# Patient Record
Sex: FEMALE | Race: WHITE | NOT HISPANIC OR LATINO | URBAN - METROPOLITAN AREA
[De-identification: names, ages, dates, MRNs, and addresses within clinical notes are randomized per-mention and may not be internally consistent; named-entity substitution may affect disease eponyms.]

---

## 2015-03-17 RX ORDER — WARFARIN SODIUM 2.5 MG/1
1 TABLET ORAL
Qty: 0 | Refills: 0 | DISCHARGE
Start: 2015-03-17

## 2018-06-06 ENCOUNTER — EMERGENCY (EMERGENCY)
Facility: HOSPITAL | Age: 73
LOS: 1 days | Discharge: HOME | End: 2018-06-06
Attending: EMERGENCY MEDICINE
Payer: MEDICARE

## 2018-06-06 VITALS
DIASTOLIC BLOOD PRESSURE: 78 MMHG | OXYGEN SATURATION: 98 % | TEMPERATURE: 98 F | HEART RATE: 72 BPM | RESPIRATION RATE: 18 BRPM | SYSTOLIC BLOOD PRESSURE: 148 MMHG

## 2018-06-06 VITALS
DIASTOLIC BLOOD PRESSURE: 90 MMHG | RESPIRATION RATE: 18 BRPM | SYSTOLIC BLOOD PRESSURE: 154 MMHG | HEART RATE: 84 BPM | TEMPERATURE: 98 F | OXYGEN SATURATION: 99 %

## 2018-06-06 PROCEDURE — 72131 CT LUMBAR SPINE W/O DYE: CPT

## 2018-06-06 PROCEDURE — 72131 CT LUMBAR SPINE W/O DYE: CPT | Mod: 26

## 2018-06-06 PROCEDURE — 99284 EMERGENCY DEPT VISIT MOD MDM: CPT | Mod: 25

## 2018-06-06 PROCEDURE — 96372 THER/PROPH/DIAG INJ SC/IM: CPT

## 2018-06-06 PROCEDURE — 99285 EMERGENCY DEPT VISIT HI MDM: CPT

## 2018-06-06 RX ORDER — DEXAMETHASONE 0.5 MG/5ML
8 ELIXIR ORAL ONCE
Qty: 0 | Refills: 0 | Status: COMPLETED | OUTPATIENT
Start: 2018-06-06 | End: 2018-06-06

## 2018-06-06 RX ORDER — DEXAMETHASONE 0.5 MG/5ML
2 ELIXIR ORAL
Qty: 8 | Refills: 0
Start: 2018-06-06 | End: 2018-06-09

## 2018-06-06 RX ORDER — OXYCODONE AND ACETAMINOPHEN 5; 325 MG/1; MG/1
1 TABLET ORAL ONCE
Qty: 0 | Refills: 0 | Status: DISCONTINUED | OUTPATIENT
Start: 2018-06-06 | End: 2018-06-06

## 2018-06-06 RX ORDER — KETOROLAC TROMETHAMINE 30 MG/ML
60 SYRINGE (ML) INJECTION ONCE
Qty: 0 | Refills: 0 | Status: DISCONTINUED | OUTPATIENT
Start: 2018-06-06 | End: 2018-06-06

## 2018-06-06 RX ADMIN — Medication 60 MILLIGRAM(S): at 17:05

## 2018-06-06 RX ADMIN — OXYCODONE AND ACETAMINOPHEN 1 TABLET(S): 5; 325 TABLET ORAL at 17:06

## 2018-06-06 RX ADMIN — Medication 8 MILLIGRAM(S): at 16:59

## 2018-06-06 NOTE — ED PROVIDER NOTE - OBJECTIVE STATEMENT
Pertinent PMH/PSH/FHx/SHx and Review of Systems contained within:  patient offered telephonic interpretation but prefers her daughtger  73F hx of cva with left sided weakness, afib on coumadin pw right sided leg pain radiating from the back. no trauma, no fall. symptoms started approx in may and have been ongoing since then. she was given a presumptive dx of sciatica and has been taking tylenol for pain. however, the condition has been worsening with pain so bad she could hardly get out of bed today. Patient denies numbness, tingling or weakness of the lower extremity and denies retention or incontinence of the bowel or bladder. no other cp, sob, ha, vision change, nausea, vomiting, fever, rash, bleeding  Fh and Sh not otherwise contributory  ROS otherwise negative

## 2018-06-06 NOTE — ED PROVIDER NOTE - PHYSICAL EXAMINATION
Gen: Alert, NAD  Head: NC, AT   Eyes: PERRL, EOMI, normal lids/conjunctiva  ENT: normal hearing, patent oropharynx without erythema/exudate, uvula midline  Neck: supple, no tenderness, Trachea midline  Pulm: Bilateral BS, normal resp effort, no wheeze/stridor/retractions  CV: RRR, no M/R/G, 2+ radial and dp pulses bl, no edema  Abd: soft, NT/ND, +BS, no hepatosplenomegaly  Mskel: pain with passive elevation of the right lower ext. wearing knee brace   no ctl spine ttp.   Skin: bright red petechial rash of lower ext (daughter says this is a chronic condition). wearing patch on the lower back.  Neuro: AAOx3, decreased motor strength of lower ext on the left 4/5. decreased motor on the right 4/5 (appears 2/2 pain)

## 2018-06-06 NOTE — ED PROVIDER NOTE - MEDICAL DECISION MAKING DETAILS
patient pw right sided sciatica. patient pw right sided sciatica. lumbar ct shows mulitple level disc disease. will give pain meds and steroids and dc with surgical follow up.

## 2018-06-06 NOTE — ED ADULT NURSE NOTE - OBJECTIVE STATEMENT
Patient BIBA for inability to move . As per patients daughter at bedside patient has possible sciatica nerve problems and was seen by her MD and was given muscle relaxant and was taken it but was schedule for twice a day but she only gave mom at night due to fact that it made her mom drowsy, Today her mom called her and couldn't move screaming in pain to right leg. patient daughter called EMS

## 2018-06-06 NOTE — ED ADULT NURSE NOTE - CHPI ED SYMPTOMS NEG
no numbness/no dizziness/no chills/no decreased eating/drinking/no nausea/no vomiting/no tingling/no fever

## 2020-04-04 ENCOUNTER — INPATIENT (INPATIENT)
Facility: HOSPITAL | Age: 75
LOS: 0 days | Discharge: OTHER INSTITUTION | DRG: 177 | End: 2020-04-05
Attending: INTERNAL MEDICINE | Admitting: INTERNAL MEDICINE
Payer: MEDICARE

## 2020-04-04 VITALS
OXYGEN SATURATION: 96 % | SYSTOLIC BLOOD PRESSURE: 161 MMHG | DIASTOLIC BLOOD PRESSURE: 99 MMHG | HEART RATE: 97 BPM | TEMPERATURE: 100 F | RESPIRATION RATE: 19 BRPM

## 2020-04-04 LAB
ACETONE SERPL-MCNC: NEGATIVE — SIGNIFICANT CHANGE UP
ALBUMIN SERPL ELPH-MCNC: 3.8 G/DL — SIGNIFICANT CHANGE UP (ref 3.5–5)
ALP SERPL-CCNC: 173 U/L — HIGH (ref 40–120)
ALT FLD-CCNC: 58 U/L DA — SIGNIFICANT CHANGE UP (ref 10–60)
ANION GAP SERPL CALC-SCNC: 9 MMOL/L — SIGNIFICANT CHANGE UP (ref 5–17)
AST SERPL-CCNC: 34 U/L — SIGNIFICANT CHANGE UP (ref 10–40)
BASOPHILS # BLD AUTO: 0.03 K/UL — SIGNIFICANT CHANGE UP (ref 0–0.2)
BASOPHILS NFR BLD AUTO: 0.5 % — SIGNIFICANT CHANGE UP (ref 0–2)
BILIRUB SERPL-MCNC: 0.8 MG/DL — SIGNIFICANT CHANGE UP (ref 0.2–1.2)
BUN SERPL-MCNC: 14 MG/DL — SIGNIFICANT CHANGE UP (ref 7–18)
CALCIUM SERPL-MCNC: 8.7 MG/DL — SIGNIFICANT CHANGE UP (ref 8.4–10.5)
CHLORIDE SERPL-SCNC: 102 MMOL/L — SIGNIFICANT CHANGE UP (ref 96–108)
CK SERPL-CCNC: 115 U/L — SIGNIFICANT CHANGE UP (ref 21–215)
CO2 SERPL-SCNC: 24 MMOL/L — SIGNIFICANT CHANGE UP (ref 22–31)
CREAT SERPL-MCNC: 0.92 MG/DL — SIGNIFICANT CHANGE UP (ref 0.5–1.3)
EOSINOPHIL # BLD AUTO: 0.1 K/UL — SIGNIFICANT CHANGE UP (ref 0–0.5)
EOSINOPHIL NFR BLD AUTO: 1.7 % — SIGNIFICANT CHANGE UP (ref 0–6)
GLUCOSE SERPL-MCNC: 98 MG/DL — SIGNIFICANT CHANGE UP (ref 70–99)
HCT VFR BLD CALC: 56 % — HIGH (ref 34.5–45)
HGB BLD-MCNC: 18.7 G/DL — HIGH (ref 11.5–15.5)
IMM GRANULOCYTES NFR BLD AUTO: 0.2 % — SIGNIFICANT CHANGE UP (ref 0–1.5)
LACTATE SERPL-SCNC: 1.7 MMOL/L — SIGNIFICANT CHANGE UP (ref 0.7–2)
LYMPHOCYTES # BLD AUTO: 1.85 K/UL — SIGNIFICANT CHANGE UP (ref 1–3.3)
LYMPHOCYTES # BLD AUTO: 31.8 % — SIGNIFICANT CHANGE UP (ref 13–44)
MAGNESIUM SERPL-MCNC: 2 MG/DL — SIGNIFICANT CHANGE UP (ref 1.6–2.6)
MCHC RBC-ENTMCNC: 29.5 PG — SIGNIFICANT CHANGE UP (ref 27–34)
MCHC RBC-ENTMCNC: 33.4 GM/DL — SIGNIFICANT CHANGE UP (ref 32–36)
MCV RBC AUTO: 88.3 FL — SIGNIFICANT CHANGE UP (ref 80–100)
MONOCYTES # BLD AUTO: 0.48 K/UL — SIGNIFICANT CHANGE UP (ref 0–0.9)
MONOCYTES NFR BLD AUTO: 8.3 % — SIGNIFICANT CHANGE UP (ref 2–14)
NEUTROPHILS # BLD AUTO: 3.34 K/UL — SIGNIFICANT CHANGE UP (ref 1.8–7.4)
NEUTROPHILS NFR BLD AUTO: 57.5 % — SIGNIFICANT CHANGE UP (ref 43–77)
NRBC # BLD: 0 /100 WBCS — SIGNIFICANT CHANGE UP (ref 0–0)
NT-PROBNP SERPL-SCNC: 726 PG/ML — HIGH (ref 0–450)
PLATELET # BLD AUTO: 142 K/UL — LOW (ref 150–400)
POTASSIUM SERPL-MCNC: 3.9 MMOL/L — SIGNIFICANT CHANGE UP (ref 3.5–5.3)
POTASSIUM SERPL-SCNC: 3.9 MMOL/L — SIGNIFICANT CHANGE UP (ref 3.5–5.3)
PROT SERPL-MCNC: 8.1 G/DL — SIGNIFICANT CHANGE UP (ref 6–8.3)
RBC # BLD: 6.34 M/UL — HIGH (ref 3.8–5.2)
RBC # FLD: 13.5 % — SIGNIFICANT CHANGE UP (ref 10.3–14.5)
SODIUM SERPL-SCNC: 135 MMOL/L — SIGNIFICANT CHANGE UP (ref 135–145)
TROPONIN I SERPL-MCNC: <0.015 NG/ML — SIGNIFICANT CHANGE UP (ref 0–0.04)
WBC # BLD: 5.81 K/UL — SIGNIFICANT CHANGE UP (ref 3.8–10.5)
WBC # FLD AUTO: 5.81 K/UL — SIGNIFICANT CHANGE UP (ref 3.8–10.5)

## 2020-04-04 PROCEDURE — 70450 CT HEAD/BRAIN W/O DYE: CPT | Mod: 26

## 2020-04-04 PROCEDURE — 71045 X-RAY EXAM CHEST 1 VIEW: CPT | Mod: 26

## 2020-04-04 RX ORDER — SODIUM CHLORIDE 9 MG/ML
1000 INJECTION INTRAMUSCULAR; INTRAVENOUS; SUBCUTANEOUS
Refills: 0 | Status: DISCONTINUED | OUTPATIENT
Start: 2020-04-04 | End: 2020-04-05

## 2020-04-04 RX ORDER — CEFTRIAXONE 500 MG/1
1000 INJECTION, POWDER, FOR SOLUTION INTRAMUSCULAR; INTRAVENOUS ONCE
Refills: 0 | Status: COMPLETED | OUTPATIENT
Start: 2020-04-04 | End: 2020-04-04

## 2020-04-04 RX ORDER — ACETAMINOPHEN 500 MG
650 TABLET ORAL ONCE
Refills: 0 | Status: COMPLETED | OUTPATIENT
Start: 2020-04-04 | End: 2020-04-04

## 2020-04-04 RX ORDER — AZITHROMYCIN 500 MG/1
500 TABLET, FILM COATED ORAL ONCE
Refills: 0 | Status: COMPLETED | OUTPATIENT
Start: 2020-04-04 | End: 2020-04-04

## 2020-04-04 RX ADMIN — Medication 650 MILLIGRAM(S): at 23:03

## 2020-04-04 RX ADMIN — SODIUM CHLORIDE 125 MILLILITER(S): 9 INJECTION INTRAMUSCULAR; INTRAVENOUS; SUBCUTANEOUS at 23:04

## 2020-04-04 NOTE — ED PROVIDER NOTE - ENMT, MLM
Airway patent, Nasal mucosa clear. Mouth with normal mucosa. Throat has no vesicles, no oropharyngeal exudates and uvula is midline. dark discoloration to tongue

## 2020-04-04 NOTE — ED PROVIDER NOTE - MUSCULOSKELETAL, MLM
Spine appears normal, range of motion is not limited, no muscle or joint tenderness, Lt arm contracture

## 2020-04-04 NOTE — ED PROVIDER NOTE - CLINICAL SUMMARY MEDICAL DECISION MAKING FREE TEXT BOX
pt with acute confusion, , concern for infectious process, or metabolic abn. also pt's on Coumadin will get CT head r/o bleed

## 2020-04-04 NOTE — ED PROVIDER NOTE - CARE PLAN
Principal Discharge DX:	PNA (pneumonia)  Secondary Diagnosis:	Dehydration  Secondary Diagnosis:	Suspected 2019 novel coronavirus infection  Secondary Diagnosis:	Acute encephalopathy

## 2020-04-04 NOTE — ED PROVIDER NOTE - OBJECTIVE STATEMENT
75 yo female with PMhx of HTN, hypercholesteremia, presents BIBA with c/o generalized weakness. A complete HPI is unable to be obtained as the patient is a poor historian. Patient states that her daughter called the ambulance for her. Patient denies fever. 73 yo female with PMhx of HTN, hypercholesteremia, presents BIBA with c/o generalized weakness. A complete HPI is unable to be obtained as the patient is a poor historian. Patient states that her daughter called the ambulance for her. Patient denies fever.  Noted pt with coughing in ED.  Spoke with pt's daughter, pt became confused since yest., c/o not feeling weak, no appetite, pt lives alone, pt's currently on Coumadin 4mg, 2 weeks ago-INR was>4, stopped for 3 days, resume Coumadin 3mg this Tues. 2/2 CVA/ paroxysmal A.fib, Pt is unable to walk,  Pt has 2 HHA.

## 2020-04-04 NOTE — ED PROVIDER NOTE - UNABLE TO OBTAIN
A complete HPI is unable to be obtained as the patient is a poor historian Severe Illness/Injury A complete ROS is unable to be obtained as the patient is a poor historian

## 2020-04-04 NOTE — ED PROVIDER NOTE - PMH
History of hypertension    Hypercholesterolemia CVA (cerebral vascular accident)    History of hypertension    Hypercholesterolemia

## 2020-04-05 ENCOUNTER — INPATIENT (INPATIENT)
Facility: HOSPITAL | Age: 75
LOS: 8 days | Discharge: NURSING, REHAB OR LONG-TERM CARE | DRG: 177 | End: 2020-04-14
Attending: INTERNAL MEDICINE | Admitting: INTERNAL MEDICINE
Payer: MEDICARE

## 2020-04-05 VITALS
HEART RATE: 92 BPM | OXYGEN SATURATION: 98 % | RESPIRATION RATE: 18 BRPM | TEMPERATURE: 98 F | SYSTOLIC BLOOD PRESSURE: 153 MMHG | DIASTOLIC BLOOD PRESSURE: 96 MMHG

## 2020-04-05 VITALS
WEIGHT: 179.9 LBS | OXYGEN SATURATION: 95 % | TEMPERATURE: 98 F | HEIGHT: 71 IN | SYSTOLIC BLOOD PRESSURE: 179 MMHG | RESPIRATION RATE: 20 BRPM | HEART RATE: 101 BPM | DIASTOLIC BLOOD PRESSURE: 113 MMHG

## 2020-04-05 DIAGNOSIS — G93.40 ENCEPHALOPATHY, UNSPECIFIED: ICD-10-CM

## 2020-04-05 DIAGNOSIS — Z86.79 PERSONAL HISTORY OF OTHER DISEASES OF THE CIRCULATORY SYSTEM: ICD-10-CM

## 2020-04-05 DIAGNOSIS — J18.9 PNEUMONIA, UNSPECIFIED ORGANISM: ICD-10-CM

## 2020-04-05 DIAGNOSIS — I48.91 UNSPECIFIED ATRIAL FIBRILLATION: ICD-10-CM

## 2020-04-05 DIAGNOSIS — Z29.9 ENCOUNTER FOR PROPHYLACTIC MEASURES, UNSPECIFIED: ICD-10-CM

## 2020-04-05 LAB
ALBUMIN SERPL ELPH-MCNC: 3.2 G/DL — LOW (ref 3.4–5)
ALP SERPL-CCNC: 144 U/L — HIGH (ref 40–120)
ALT FLD-CCNC: 55 U/L — HIGH (ref 12–42)
ANION GAP SERPL CALC-SCNC: 11 MMOL/L — SIGNIFICANT CHANGE UP (ref 9–16)
APTT BLD: 31.1 SEC — SIGNIFICANT CHANGE UP (ref 27.5–36.3)
AST SERPL-CCNC: 40 U/L — HIGH (ref 15–37)
BASOPHILS # BLD AUTO: 0.03 K/UL — SIGNIFICANT CHANGE UP (ref 0–0.2)
BASOPHILS NFR BLD AUTO: 0.6 % — SIGNIFICANT CHANGE UP (ref 0–2)
BILIRUB SERPL-MCNC: 0.7 MG/DL — SIGNIFICANT CHANGE UP (ref 0.2–1.2)
BUN SERPL-MCNC: 14 MG/DL — SIGNIFICANT CHANGE UP (ref 7–23)
CALCIUM SERPL-MCNC: 7.9 MG/DL — LOW (ref 8.5–10.5)
CHLORIDE SERPL-SCNC: 103 MMOL/L — SIGNIFICANT CHANGE UP (ref 96–108)
CK SERPL-CCNC: 83 U/L — SIGNIFICANT CHANGE UP (ref 26–192)
CO2 SERPL-SCNC: 25 MMOL/L — SIGNIFICANT CHANGE UP (ref 22–31)
CREAT SERPL-MCNC: 0.89 MG/DL — SIGNIFICANT CHANGE UP (ref 0.5–1.3)
CRP SERPL-MCNC: 3.7 MG/DL — HIGH (ref 0–0.9)
DIGOXIN SERPL-MCNC: 0.9 NG/ML — SIGNIFICANT CHANGE UP (ref 0.8–2)
EOSINOPHIL # BLD AUTO: 0 K/UL — SIGNIFICANT CHANGE UP (ref 0–0.5)
EOSINOPHIL NFR BLD AUTO: 0 % — SIGNIFICANT CHANGE UP (ref 0–6)
FLU A RESULT: SIGNIFICANT CHANGE UP
FLUAV AG NPH QL: SIGNIFICANT CHANGE UP
FLUAV AG NPH QL: SIGNIFICANT CHANGE UP
FLUBV AG NPH QL: SIGNIFICANT CHANGE UP
FLUBV AG NPH QL: SIGNIFICANT CHANGE UP
GLUCOSE SERPL-MCNC: 93 MG/DL — SIGNIFICANT CHANGE UP (ref 70–99)
HCT VFR BLD CALC: 51 % — HIGH (ref 34.5–45)
HGB BLD-MCNC: 17.5 G/DL — HIGH (ref 11.5–15.5)
IMM GRANULOCYTES NFR BLD AUTO: 0.2 % — SIGNIFICANT CHANGE UP (ref 0–1.5)
INR BLD: 1.31 RATIO — HIGH (ref 0.88–1.16)
INR BLD: 1.32 — HIGH (ref 0.88–1.16)
LACTATE SERPL-SCNC: 1.4 MMOL/L — SIGNIFICANT CHANGE UP (ref 0.4–2)
LYMPHOCYTES # BLD AUTO: 1.9 K/UL — SIGNIFICANT CHANGE UP (ref 1–3.3)
LYMPHOCYTES # BLD AUTO: 37.5 % — SIGNIFICANT CHANGE UP (ref 13–44)
MCHC RBC-ENTMCNC: 30 PG — SIGNIFICANT CHANGE UP (ref 27–34)
MCHC RBC-ENTMCNC: 34.3 GM/DL — SIGNIFICANT CHANGE UP (ref 32–36)
MCV RBC AUTO: 87.3 FL — SIGNIFICANT CHANGE UP (ref 80–100)
MONOCYTES # BLD AUTO: 0.49 K/UL — SIGNIFICANT CHANGE UP (ref 0–0.9)
MONOCYTES NFR BLD AUTO: 9.7 % — SIGNIFICANT CHANGE UP (ref 2–14)
NEUTROPHILS # BLD AUTO: 2.63 K/UL — SIGNIFICANT CHANGE UP (ref 1.8–7.4)
NEUTROPHILS NFR BLD AUTO: 52 % — SIGNIFICANT CHANGE UP (ref 43–77)
NRBC # BLD: 0 /100 WBCS — SIGNIFICANT CHANGE UP (ref 0–0)
NT-PROBNP SERPL-SCNC: 498 PG/ML — HIGH
PLATELET # BLD AUTO: 126 K/UL — LOW (ref 150–400)
POTASSIUM SERPL-MCNC: 3 MMOL/L — LOW (ref 3.5–5.3)
POTASSIUM SERPL-SCNC: 3 MMOL/L — LOW (ref 3.5–5.3)
PROT SERPL-MCNC: 6.9 G/DL — SIGNIFICANT CHANGE UP (ref 6.4–8.2)
PROTHROM AB SERPL-ACNC: 14.6 SEC — HIGH (ref 10–12.9)
PROTHROM AB SERPL-ACNC: 14.9 SEC — HIGH (ref 10–12.9)
RAPID RVP RESULT: SIGNIFICANT CHANGE UP
RBC # BLD: 5.84 M/UL — HIGH (ref 3.8–5.2)
RBC # FLD: 13.6 % — SIGNIFICANT CHANGE UP (ref 10.3–14.5)
RSV RESULT: SIGNIFICANT CHANGE UP
RSV RESULT: SIGNIFICANT CHANGE UP
RSV RNA RESP QL NAA+PROBE: SIGNIFICANT CHANGE UP
RSV RNA RESP QL NAA+PROBE: SIGNIFICANT CHANGE UP
SODIUM SERPL-SCNC: 139 MMOL/L — SIGNIFICANT CHANGE UP (ref 132–145)
TROPONIN I SERPL-MCNC: 0.02 NG/ML — SIGNIFICANT CHANGE UP (ref 0.02–0.06)
WBC # BLD: 5.06 K/UL — SIGNIFICANT CHANGE UP (ref 3.8–10.5)
WBC # FLD AUTO: 5.06 K/UL — SIGNIFICANT CHANGE UP (ref 3.8–10.5)

## 2020-04-05 PROCEDURE — 93010 ELECTROCARDIOGRAM REPORT: CPT

## 2020-04-05 PROCEDURE — 83605 ASSAY OF LACTIC ACID: CPT

## 2020-04-05 PROCEDURE — 99285 EMERGENCY DEPT VISIT HI MDM: CPT

## 2020-04-05 PROCEDURE — 70450 CT HEAD/BRAIN W/O DYE: CPT

## 2020-04-05 PROCEDURE — 87631 RESP VIRUS 3-5 TARGETS: CPT

## 2020-04-05 PROCEDURE — 93005 ELECTROCARDIOGRAM TRACING: CPT

## 2020-04-05 PROCEDURE — 80053 COMPREHEN METABOLIC PANEL: CPT

## 2020-04-05 PROCEDURE — 83880 ASSAY OF NATRIURETIC PEPTIDE: CPT

## 2020-04-05 PROCEDURE — 85610 PROTHROMBIN TIME: CPT

## 2020-04-05 PROCEDURE — 82550 ASSAY OF CK (CPK): CPT

## 2020-04-05 PROCEDURE — 87633 RESP VIRUS 12-25 TARGETS: CPT

## 2020-04-05 PROCEDURE — 83735 ASSAY OF MAGNESIUM: CPT

## 2020-04-05 PROCEDURE — 71250 CT THORAX DX C-: CPT | Mod: 26

## 2020-04-05 PROCEDURE — 87635 SARS-COV-2 COVID-19 AMP PRB: CPT

## 2020-04-05 PROCEDURE — 82009 KETONE BODYS QUAL: CPT

## 2020-04-05 PROCEDURE — 87486 CHLMYD PNEUM DNA AMP PROBE: CPT

## 2020-04-05 PROCEDURE — 70450 CT HEAD/BRAIN W/O DYE: CPT | Mod: 26

## 2020-04-05 PROCEDURE — 85027 COMPLETE CBC AUTOMATED: CPT

## 2020-04-05 PROCEDURE — 71045 X-RAY EXAM CHEST 1 VIEW: CPT

## 2020-04-05 PROCEDURE — 87040 BLOOD CULTURE FOR BACTERIA: CPT

## 2020-04-05 PROCEDURE — 36415 COLL VENOUS BLD VENIPUNCTURE: CPT

## 2020-04-05 PROCEDURE — 87798 DETECT AGENT NOS DNA AMP: CPT

## 2020-04-05 PROCEDURE — 84484 ASSAY OF TROPONIN QUANT: CPT

## 2020-04-05 PROCEDURE — 80162 ASSAY OF DIGOXIN TOTAL: CPT

## 2020-04-05 PROCEDURE — 87581 M.PNEUMON DNA AMP PROBE: CPT

## 2020-04-05 RX ORDER — THIAMINE MONONITRATE (VIT B1) 100 MG
200 TABLET ORAL DAILY
Refills: 0 | Status: DISCONTINUED | OUTPATIENT
Start: 2020-04-05 | End: 2020-04-05

## 2020-04-05 RX ORDER — ACETAMINOPHEN 500 MG
650 TABLET ORAL ONCE
Refills: 0 | Status: COMPLETED | OUTPATIENT
Start: 2020-04-05 | End: 2020-04-05

## 2020-04-05 RX ORDER — METOPROLOL TARTRATE 50 MG
100 TABLET ORAL
Refills: 0 | Status: DISCONTINUED | OUTPATIENT
Start: 2020-04-05 | End: 2020-04-05

## 2020-04-05 RX ORDER — AZITHROMYCIN 500 MG/1
500 TABLET, FILM COATED ORAL ONCE
Refills: 0 | Status: DISCONTINUED | OUTPATIENT
Start: 2020-04-05 | End: 2020-04-05

## 2020-04-05 RX ORDER — CEFTRIAXONE 500 MG/1
1000 INJECTION, POWDER, FOR SOLUTION INTRAMUSCULAR; INTRAVENOUS ONCE
Refills: 0 | Status: COMPLETED | OUTPATIENT
Start: 2020-04-05 | End: 2020-04-05

## 2020-04-05 RX ORDER — ZINC SULFATE TAB 220 MG (50 MG ZINC EQUIVALENT) 220 (50 ZN) MG
220 TAB ORAL DAILY
Refills: 0 | Status: DISCONTINUED | OUTPATIENT
Start: 2020-04-05 | End: 2020-04-05

## 2020-04-05 RX ORDER — HEPARIN SODIUM 5000 [USP'U]/ML
5000 INJECTION INTRAVENOUS; SUBCUTANEOUS EVERY 6 HOURS
Refills: 0 | Status: DISCONTINUED | OUTPATIENT
Start: 2020-04-05 | End: 2020-04-06

## 2020-04-05 RX ORDER — ASCORBIC ACID 60 MG
1000 TABLET,CHEWABLE ORAL
Refills: 0 | Status: DISCONTINUED | OUTPATIENT
Start: 2020-04-05 | End: 2020-04-05

## 2020-04-05 RX ORDER — AZITHROMYCIN 500 MG/1
250 TABLET, FILM COATED ORAL DAILY
Refills: 0 | Status: DISCONTINUED | OUTPATIENT
Start: 2020-04-06 | End: 2020-04-05

## 2020-04-05 RX ORDER — SODIUM CHLORIDE 9 MG/ML
1000 INJECTION INTRAMUSCULAR; INTRAVENOUS; SUBCUTANEOUS ONCE
Refills: 0 | Status: COMPLETED | OUTPATIENT
Start: 2020-04-05 | End: 2020-04-05

## 2020-04-05 RX ORDER — CEFTRIAXONE 500 MG/1
1000 INJECTION, POWDER, FOR SOLUTION INTRAMUSCULAR; INTRAVENOUS EVERY 24 HOURS
Refills: 0 | Status: DISCONTINUED | OUTPATIENT
Start: 2020-04-05 | End: 2020-04-05

## 2020-04-05 RX ORDER — ATORVASTATIN CALCIUM 80 MG/1
20 TABLET, FILM COATED ORAL AT BEDTIME
Refills: 0 | Status: DISCONTINUED | OUTPATIENT
Start: 2020-04-05 | End: 2020-04-05

## 2020-04-05 RX ORDER — DILTIAZEM HCL 120 MG
180 CAPSULE, EXT RELEASE 24 HR ORAL DAILY
Refills: 0 | Status: DISCONTINUED | OUTPATIENT
Start: 2020-04-05 | End: 2020-04-05

## 2020-04-05 RX ORDER — AZITHROMYCIN 500 MG/1
500 TABLET, FILM COATED ORAL ONCE
Refills: 0 | Status: COMPLETED | OUTPATIENT
Start: 2020-04-05 | End: 2020-04-05

## 2020-04-05 RX ORDER — WARFARIN SODIUM 2.5 MG/1
3 TABLET ORAL ONCE
Refills: 0 | Status: DISCONTINUED | OUTPATIENT
Start: 2020-04-05 | End: 2020-04-05

## 2020-04-05 RX ORDER — DIGOXIN 250 MCG
0.12 TABLET ORAL DAILY
Refills: 0 | Status: DISCONTINUED | OUTPATIENT
Start: 2020-04-05 | End: 2020-04-05

## 2020-04-05 RX ORDER — HEPARIN SODIUM 5000 [USP'U]/ML
5000 INJECTION INTRAVENOUS; SUBCUTANEOUS ONCE
Refills: 0 | Status: COMPLETED | OUTPATIENT
Start: 2020-04-05 | End: 2020-04-05

## 2020-04-05 RX ORDER — HEPARIN SODIUM 5000 [USP'U]/ML
INJECTION INTRAVENOUS; SUBCUTANEOUS
Qty: 25000 | Refills: 0 | Status: DISCONTINUED | OUTPATIENT
Start: 2020-04-05 | End: 2020-04-06

## 2020-04-05 RX ORDER — ASCORBIC ACID 60 MG
1500 TABLET,CHEWABLE ORAL
Refills: 0 | Status: DISCONTINUED | OUTPATIENT
Start: 2020-04-05 | End: 2020-04-05

## 2020-04-05 RX ORDER — POTASSIUM CHLORIDE 20 MEQ
20 PACKET (EA) ORAL ONCE
Refills: 0 | Status: COMPLETED | OUTPATIENT
Start: 2020-04-05 | End: 2020-04-05

## 2020-04-05 RX ORDER — DILTIAZEM HCL 120 MG
10 CAPSULE, EXT RELEASE 24 HR ORAL ONCE
Refills: 0 | Status: COMPLETED | OUTPATIENT
Start: 2020-04-05 | End: 2020-04-05

## 2020-04-05 RX ADMIN — CEFTRIAXONE 1000 MILLIGRAM(S): 500 INJECTION, POWDER, FOR SOLUTION INTRAMUSCULAR; INTRAVENOUS at 19:30

## 2020-04-05 RX ADMIN — HEPARIN SODIUM 5000 UNIT(S): 5000 INJECTION INTRAVENOUS; SUBCUTANEOUS at 22:41

## 2020-04-05 RX ADMIN — Medication 20 MILLIEQUIVALENT(S): at 20:32

## 2020-04-05 RX ADMIN — SODIUM CHLORIDE 100 MILLILITER(S): 9 INJECTION INTRAMUSCULAR; INTRAVENOUS; SUBCUTANEOUS at 17:40

## 2020-04-05 RX ADMIN — AZITHROMYCIN 255 MILLIGRAM(S): 500 TABLET, FILM COATED ORAL at 01:30

## 2020-04-05 RX ADMIN — HEPARIN SODIUM 1000 UNIT(S)/HR: 5000 INJECTION INTRAVENOUS; SUBCUTANEOUS at 22:41

## 2020-04-05 RX ADMIN — AZITHROMYCIN 500 MILLIGRAM(S): 500 TABLET, FILM COATED ORAL at 20:58

## 2020-04-05 RX ADMIN — CEFTRIAXONE 100 MILLIGRAM(S): 500 INJECTION, POWDER, FOR SOLUTION INTRAMUSCULAR; INTRAVENOUS at 00:59

## 2020-04-05 RX ADMIN — CEFTRIAXONE 1000 MILLIGRAM(S): 500 INJECTION, POWDER, FOR SOLUTION INTRAMUSCULAR; INTRAVENOUS at 01:30

## 2020-04-05 RX ADMIN — Medication 650 MILLIGRAM(S): at 17:31

## 2020-04-05 RX ADMIN — AZITHROMYCIN 255 MILLIGRAM(S): 500 TABLET, FILM COATED ORAL at 18:25

## 2020-04-05 RX ADMIN — Medication 10 MILLIGRAM(S): at 21:50

## 2020-04-05 RX ADMIN — CEFTRIAXONE 100 MILLIGRAM(S): 500 INJECTION, POWDER, FOR SOLUTION INTRAMUSCULAR; INTRAVENOUS at 18:00

## 2020-04-05 RX ADMIN — SODIUM CHLORIDE 1000 MILLILITER(S): 9 INJECTION INTRAMUSCULAR; INTRAVENOUS; SUBCUTANEOUS at 20:58

## 2020-04-05 NOTE — CHART NOTE - NSCHARTNOTEFT_GEN_A_CORE
NP called pt's ER Elicia # [phone] and informed of pt's trf to [facility].  Dtr verbalized understanding.

## 2020-04-05 NOTE — ED ADULT NURSE NOTE - OBJECTIVE STATEMENT
F BIBA for weakness. Pt states "I have been feeling weak for the past 4 days but its getting worse and today I couldn't walk". Pt denies n/v/d/coucgh/CP or SOB at this time. Pt is A&Ox2 at this time. Pt placed on cont pulse ox and telemetry monitoring at this time. Pt has noted contraction of left arm which she states is d/t past stroke. Pt states she is unsure about her medications and states she has high cholesterol and h/o stroke. Pt states daughter knows information, MD Hernandez notified. F BIBA for weakness. Pt states "I have been feeling weak for the past 4 days but its getting worse and today I couldn't walk". Pt denies n/v/d/coucgh/CP or SOB at this time. Pt is A&Ox2 at this time. Pt placed on cont pulse ox and telemetry monitoring at this time. Pt has noted contraction of left arm which she states is d/t past stroke. Pt states she is unsure about her medications and states she has high cholesterol and h/o stroke. Pt states daughter knows information, MD Hernandez notified. Cont pulse ox and telemetry monitoring initiated.

## 2020-04-05 NOTE — H&P ADULT - PROBLEM SELECTOR PLAN 5
IMPROVE VTE Individual Risk Assessment   RISK                                                          Points  [  ] Previous VTE                                                3  [  ] Thrombophilia                                             2  [  ] Lower limb paralysis                                    2        (unable to hold up >15 seconds)    [  ] Current Cancer                                             2         (within 6 months)  [  x] Immobilization > 24 hrs                              1  [  ] ICU/CCU stay > 24 hours                            1  [x  ] Age > 60                                                    1  IMPROVE VTE Score _________2   Pt is on coumadin

## 2020-04-05 NOTE — ED ADULT NURSE REASSESSMENT NOTE - NS ED NURSE REASSESS COMMENT FT1
multiple attemtps made to obtain IV access unsuccessful. MD Hernandez notified at this time. Pt has noted 20g to right had which does not pull back blood. Patent for IV fluids. MD Aware. Will continue to monitor.

## 2020-04-05 NOTE — ED ADULT TRIAGE NOTE - CHIEF COMPLAINT QUOTE
BIBA from [facility] as per paperwork c/o unresolved weakness x2wks accompanied with confusion. dx pna/dehydration r/o covid. BIBA from [facility] as per paperwork c/o unresolved weakness x2wks accompanied with confusion. dx pna/dehydration r/o covid. hx cva/htn

## 2020-04-05 NOTE — ACUTE INTERFACILITY TRANSFER NOTE - HOSPITAL COURSE
Pt is a 75 y/o Bengali speaking elderly female   from home, with PMH of CVA, Afib ( on coumadin) , HTN, HLD was BIBA with complains of generalized weakness and confusion. Patient's daughter noted her to be confused since yesterday. pt seems awake and alert. She knows where she is. She says that she is weak. Denied shortness of breath, cough, fever, nausea, vomiting and abdominal pain. History is limited as pt seems confused at times.  Spoke w/ Dtr Coreen but she's unsure if pt has HCP.

## 2020-04-05 NOTE — ED PROVIDER NOTE - OBJECTIVE STATEMENT
75 y/o F with PMHx of HTN presents to the ED via EMS for generalized weakness. As per the triage note, Pt was BIb EMS from [facility] for 2 weeks of generalized weakness that has not resolved. Pt was also noted to have confusion at times. On arrival, Pt was found to be febrile. Unable to obtain remainder of history secondary to language barriers (Pt speaks Icelandic).

## 2020-04-05 NOTE — H&P ADULT - PROBLEM SELECTOR PLAN 3
Continue Coumadin  for anticoagulation  Monitor PT/INR daily  Continue Digoxin, metoprolol for rate control

## 2020-04-05 NOTE — ED ADULT NURSE NOTE - NSIMPLEMENTINTERV_GEN_ALL_ED
Implemented All Fall Risk Interventions:  [place] to call system. Call bell, personal items and telephone within reach. Instruct patient to call for assistance. Room bathroom lighting operational. Non-slip footwear when patient is off stretcher. Physically safe environment: no spills, clutter or unnecessary equipment. Stretcher in lowest position, wheels locked, appropriate side rails in place. Provide visual cue, wrist band, yellow gown, etc. Monitor gait and stability. Monitor for mental status changes and reorient to person, place, and time. Review medications for side effects contributing to fall risk. Reinforce activity limits and safety measures with patient and family.

## 2020-04-05 NOTE — H&P ADULT - NSICDXPASTMEDICALHX_GEN_ALL_CORE_FT
PAST MEDICAL HISTORY:  CVA (cerebral vascular accident)     History of hypertension     Hypercholesterolemia

## 2020-04-05 NOTE — H&P ADULT - ASSESSMENT
Pt is a 73 y/o Citizen of [place] speaking elderly female   from home, with PMH of CVA, Afib ( on coumadin) , HTN, HLD was BIBA with complains of generalized weakness and confusion.  Pt is admitted for suspected viral pneumonia.

## 2020-04-05 NOTE — H&P ADULT - PROBLEM SELECTOR PLAN 2
Chest x-ray showed bilateral infiltrate  Suspected COVID-19  F/u COVID-19 PCR  Isolation precaution  Continue Ceftriaxone, azithromycin  Continue Thiamin, Vitamin C  F/u Blood culture  F/u D-dimer. CRP, ferritin, LDH Chest x-ray showed bilateral infiltrate  Suspected COVID-19  F/u COVID-19 PCR  Isolation precaution  Continue Ceftriaxone, azithromycin  Continue Thiamin, Vitamin C / Zinc  F/u Blood culture  F/u D-dimer. CRP, ferritin, LDH

## 2020-04-05 NOTE — ED ADULT NURSE REASSESSMENT NOTE - NS ED NURSE REASSESS COMMENT FT1
Pt at this time being transferred to [facility]. No acute distress noted. Safety maintained.

## 2020-04-05 NOTE — ED PROVIDER NOTE - PATIENT PORTAL LINK FT
You can access the FollowMyHealth Patient Portal offered by [facility] by registering at the following website: [email or web address]. By joining [employer]’s FollowMyHealth portal, you will also be able to view your health information using other applications (apps) compatible with our system.

## 2020-04-05 NOTE — ED ADULT NURSE NOTE - CHIEF COMPLAINT QUOTE
BIBA from [facility] as per paperwork c/o unresolved weakness x2wks accompanied with confusion. dx pna/dehydration r/o covid. hx cva/htn

## 2020-04-05 NOTE — ED ADULT NURSE REASSESSMENT NOTE - NS ED NURSE REASSESS COMMENT FT1
Blood cultures x 1 bottle and Bloods drawn by arterial stick via MD Hernandez. MD intructed only one set of blood cultures to send and OK to start abx.

## 2020-04-05 NOTE — ED PROVIDER NOTE - CARE PLAN
Principal Discharge DX:	Atrial flutter by electrocardiography  Secondary Diagnosis:	Respiratory infection

## 2020-04-05 NOTE — H&P ADULT - HISTORY OF PRESENT ILLNESS
Pt is a 73 y/o Mosotho speaking elderly female   from home, with PMH of CVA, Afib ( on coumadin) , HTN, HLD was BIBA with complains of generalized weakness and confusion. Patient's daughter noted her to be confused since yesterday. pt seems awake and alert. She knows where she is. She says that she is weak. Denied shortness of breath, cough, fever, nausea, vomiting and abdominal pain. History is limited as pt seems confused at times.

## 2020-04-05 NOTE — ED ADULT NURSE NOTE - NSIMPLEMENTINTERV_GEN_ALL_ED
Implemented All Fall with Harm Risk Interventions:  [place] to call system. Call bell, personal items and telephone within reach. Instruct patient to call for assistance. Room bathroom lighting operational. Non-slip footwear when patient is off stretcher. Physically safe environment: no spills, clutter or unnecessary equipment. Stretcher in lowest position, wheels locked, appropriate side rails in place. Provide visual cue, wrist band, yellow gown, etc. Monitor gait and stability. Monitor for mental status changes and reorient to person, place, and time. Review medications for side effects contributing to fall risk. Reinforce activity limits and safety measures with patient and family. Provide visual clues: red socks.

## 2020-04-05 NOTE — H&P ADULT - NSHPLABSRESULTS_GEN_ALL_CORE
18.7   5.81  )-----------( 142      ( 04 Apr 2020 23:00 )             56.0       04-04    135  |  102  |  14  ----------------------------<  98  3.9   |  24  |  0.92    Ca    8.7      04 Apr 2020 23:00  Mg     2.0     04-04    TPro  8.1  /  Alb  3.8  /  TBili  0.8  /  DBili  x   /  AST  34  /  ALT  58  /  AlkPhos  173<H>  04-04

## 2020-04-05 NOTE — ED ADULT NURSE REASSESSMENT NOTE - NS ED NURSE REASSESS COMMENT FT1
Pt resting in bed, admitted to medicine service, awaiting floor bed. no acute distress noted, denies chest pain, no shortness of breath indicated./ Safety maintained.

## 2020-04-05 NOTE — ED PROVIDER NOTE - CLINICAL SUMMARY MEDICAL DECISION MAKING FREE TEXT BOX
75 y/o F presents to the ED via EMS from [facility] for unresolved generalized weakness. On arrival, Pt was noted to be febrile with a temperature of 101. Will order Zithromax and Rocephin for symptomatic relief. Plan for basic labs, EKG, CXR and CT head and chest. Will reassess afterwards.

## 2020-04-05 NOTE — ED ADULT NURSE REASSESSMENT NOTE - NS ED NURSE REASSESS COMMENT FT1
Multiple attempts for second IV access done by myself and colleague RN. MD Bhakta notified and aware, IV heparin drip initiated.

## 2020-04-06 DIAGNOSIS — Z29.9 ENCOUNTER FOR PROPHYLACTIC MEASURES, UNSPECIFIED: ICD-10-CM

## 2020-04-06 DIAGNOSIS — I63.9 CEREBRAL INFARCTION, UNSPECIFIED: ICD-10-CM

## 2020-04-06 DIAGNOSIS — I10 ESSENTIAL (PRIMARY) HYPERTENSION: ICD-10-CM

## 2020-04-06 DIAGNOSIS — E78.5 HYPERLIPIDEMIA, UNSPECIFIED: ICD-10-CM

## 2020-04-06 DIAGNOSIS — B34.2 CORONAVIRUS INFECTION, UNSPECIFIED: ICD-10-CM

## 2020-04-06 DIAGNOSIS — H40.9 UNSPECIFIED GLAUCOMA: ICD-10-CM

## 2020-04-06 DIAGNOSIS — R29.898 OTHER SYMPTOMS AND SIGNS INVOLVING THE MUSCULOSKELETAL SYSTEM: ICD-10-CM

## 2020-04-06 DIAGNOSIS — I48.91 UNSPECIFIED ATRIAL FIBRILLATION: ICD-10-CM

## 2020-04-06 DIAGNOSIS — R63.8 OTHER SYMPTOMS AND SIGNS CONCERNING FOOD AND FLUID INTAKE: ICD-10-CM
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
## 2020-04-06 LAB
ALBUMIN SERPL ELPH-MCNC: 3.6 G/DL — SIGNIFICANT CHANGE UP (ref 3.3–5)
ALP SERPL-CCNC: 134 U/L — HIGH (ref 40–120)
ALT FLD-CCNC: SIGNIFICANT CHANGE UP U/L (ref 10–45)
ANION GAP SERPL CALC-SCNC: 15 MMOL/L — SIGNIFICANT CHANGE UP (ref 5–17)
ANION GAP SERPL CALC-SCNC: 16 MMOL/L — SIGNIFICANT CHANGE UP (ref 5–17)
APTT BLD: 137.1 SEC — CRITICAL HIGH (ref 27.5–36.3)
AST SERPL-CCNC: SIGNIFICANT CHANGE UP U/L (ref 10–40)
BASOPHILS # BLD AUTO: 0 K/UL — SIGNIFICANT CHANGE UP (ref 0–0.2)
BASOPHILS NFR BLD AUTO: 0 % — SIGNIFICANT CHANGE UP (ref 0–2)
BILIRUB SERPL-MCNC: 0.7 MG/DL — SIGNIFICANT CHANGE UP (ref 0.2–1.2)
BUN SERPL-MCNC: 12 MG/DL — SIGNIFICANT CHANGE UP (ref 7–23)
BUN SERPL-MCNC: 15 MG/DL — SIGNIFICANT CHANGE UP (ref 7–23)
CALCIUM SERPL-MCNC: 8.7 MG/DL — SIGNIFICANT CHANGE UP (ref 8.4–10.5)
CALCIUM SERPL-MCNC: 8.8 MG/DL — SIGNIFICANT CHANGE UP (ref 8.4–10.5)
CHLORIDE SERPL-SCNC: 100 MMOL/L — SIGNIFICANT CHANGE UP (ref 96–108)
CHLORIDE SERPL-SCNC: 96 MMOL/L — SIGNIFICANT CHANGE UP (ref 96–108)
CO2 SERPL-SCNC: 24 MMOL/L — SIGNIFICANT CHANGE UP (ref 22–31)
CO2 SERPL-SCNC: 25 MMOL/L — SIGNIFICANT CHANGE UP (ref 22–31)
CREAT SERPL-MCNC: 0.72 MG/DL — SIGNIFICANT CHANGE UP (ref 0.5–1.3)
CREAT SERPL-MCNC: 0.77 MG/DL — SIGNIFICANT CHANGE UP (ref 0.5–1.3)
CRP SERPL-MCNC: 5.45 MG/DL — HIGH (ref 0–0.4)
D DIMER BLD IA.RAPID-MCNC: 243 NG/ML DDU — HIGH
EOSINOPHIL # BLD AUTO: 0 K/UL — SIGNIFICANT CHANGE UP (ref 0–0.5)
EOSINOPHIL NFR BLD AUTO: 0 % — SIGNIFICANT CHANGE UP (ref 0–6)
FERRITIN SERPL-MCNC: 757 NG/ML — HIGH (ref 15–150)
GLUCOSE SERPL-MCNC: 106 MG/DL — HIGH (ref 70–99)
GLUCOSE SERPL-MCNC: 95 MG/DL — SIGNIFICANT CHANGE UP (ref 70–99)
HCT VFR BLD CALC: 52.3 % — HIGH (ref 34.5–45)
HGB BLD-MCNC: 17.5 G/DL — HIGH (ref 11.5–15.5)
INR BLD: 1.3 — HIGH (ref 0.88–1.16)
LYMPHOCYTES # BLD AUTO: 1.13 K/UL — SIGNIFICANT CHANGE UP (ref 1–3.3)
LYMPHOCYTES # BLD AUTO: 17 % — SIGNIFICANT CHANGE UP (ref 13–44)
MAGNESIUM SERPL-MCNC: 1.8 MG/DL — SIGNIFICANT CHANGE UP (ref 1.6–2.6)
MAGNESIUM SERPL-MCNC: 2 MG/DL — SIGNIFICANT CHANGE UP (ref 1.6–2.6)
MCHC RBC-ENTMCNC: 29.4 PG — SIGNIFICANT CHANGE UP (ref 27–34)
MCHC RBC-ENTMCNC: 33.5 GM/DL — SIGNIFICANT CHANGE UP (ref 32–36)
MCV RBC AUTO: 87.9 FL — SIGNIFICANT CHANGE UP (ref 80–100)
MONOCYTES # BLD AUTO: 0.41 K/UL — SIGNIFICANT CHANGE UP (ref 0–0.9)
MONOCYTES NFR BLD AUTO: 6.2 % — SIGNIFICANT CHANGE UP (ref 2–14)
NEUTROPHILS # BLD AUTO: 4.97 K/UL — SIGNIFICANT CHANGE UP (ref 1.8–7.4)
NEUTROPHILS NFR BLD AUTO: 68.7 % — SIGNIFICANT CHANGE UP (ref 43–77)
PHOSPHATE SERPL-MCNC: 1.9 MG/DL — LOW (ref 2.5–4.5)
PHOSPHATE SERPL-MCNC: 2.2 MG/DL — LOW (ref 2.5–4.5)
PLATELET # BLD AUTO: 110 K/UL — LOW (ref 150–400)
POTASSIUM SERPL-MCNC: 3.3 MMOL/L — LOW (ref 3.5–5.3)
POTASSIUM SERPL-MCNC: SIGNIFICANT CHANGE UP MMOL/L (ref 3.5–5.3)
POTASSIUM SERPL-SCNC: 3.3 MMOL/L — LOW (ref 3.5–5.3)
POTASSIUM SERPL-SCNC: SIGNIFICANT CHANGE UP MMOL/L (ref 3.5–5.3)
PROT SERPL-MCNC: 7.3 G/DL — SIGNIFICANT CHANGE UP (ref 6–8.3)
PROTHROM AB SERPL-ACNC: 14.5 SEC — HIGH (ref 10–12.9)
RAPID RVP RESULT: SIGNIFICANT CHANGE UP
RBC # BLD: 5.95 M/UL — HIGH (ref 3.8–5.2)
RBC # FLD: 13.2 % — SIGNIFICANT CHANGE UP (ref 10.3–14.5)
SARS-COV-2 RNA SPEC QL NAA+PROBE: DETECTED
SODIUM SERPL-SCNC: 136 MMOL/L — SIGNIFICANT CHANGE UP (ref 135–145)
SODIUM SERPL-SCNC: 140 MMOL/L — SIGNIFICANT CHANGE UP (ref 135–145)
WBC # BLD: 6.62 K/UL — SIGNIFICANT CHANGE UP (ref 3.8–10.5)
WBC # FLD AUTO: 6.62 K/UL — SIGNIFICANT CHANGE UP (ref 3.8–10.5)

## 2020-04-06 PROCEDURE — 99222 1ST HOSP IP/OBS MODERATE 55: CPT

## 2020-04-06 RX ORDER — ACETAMINOPHEN WITH CODEINE 300MG-30MG
0 TABLET ORAL
Qty: 90 | Refills: 0 | DISCHARGE

## 2020-04-06 RX ORDER — APIXABAN 2.5 MG/1
5 TABLET, FILM COATED ORAL EVERY 12 HOURS
Refills: 0 | Status: DISCONTINUED | OUTPATIENT
Start: 2020-04-06 | End: 2020-04-14

## 2020-04-06 RX ORDER — OXYBUTYNIN CHLORIDE 5 MG
5 TABLET ORAL AT BEDTIME
Refills: 0 | Status: DISCONTINUED | OUTPATIENT
Start: 2020-04-06 | End: 2020-04-14

## 2020-04-06 RX ORDER — ATORVASTATIN CALCIUM 80 MG/1
20 TABLET, FILM COATED ORAL AT BEDTIME
Refills: 0 | Status: DISCONTINUED | OUTPATIENT
Start: 2020-04-06 | End: 2020-04-14

## 2020-04-06 RX ORDER — METOPROLOL TARTRATE 50 MG
5 TABLET ORAL EVERY 6 HOURS
Refills: 0 | Status: DISCONTINUED | OUTPATIENT
Start: 2020-04-06 | End: 2020-04-06

## 2020-04-06 RX ORDER — METOPROLOL TARTRATE 50 MG
50 TABLET ORAL EVERY 12 HOURS
Refills: 0 | Status: DISCONTINUED | OUTPATIENT
Start: 2020-04-07 | End: 2020-04-09

## 2020-04-06 RX ORDER — DORZOLAMIDE HYDROCHLORIDE TIMOLOL MALEATE 20; 5 MG/ML; MG/ML
0 SOLUTION/ DROPS OPHTHALMIC
Qty: 10 | Refills: 0 | DISCHARGE

## 2020-04-06 RX ORDER — ACETAMINOPHEN 500 MG
650 TABLET ORAL EVERY 6 HOURS
Refills: 0 | Status: DISCONTINUED | OUTPATIENT
Start: 2020-04-06 | End: 2020-04-14

## 2020-04-06 RX ORDER — POTASSIUM PHOSPHATE, MONOBASIC POTASSIUM PHOSPHATE, DIBASIC 236; 224 MG/ML; MG/ML
30 INJECTION, SOLUTION INTRAVENOUS ONCE
Refills: 0 | Status: COMPLETED | OUTPATIENT
Start: 2020-04-06 | End: 2020-04-06

## 2020-04-06 RX ORDER — DILTIAZEM HCL 120 MG
240 CAPSULE, EXT RELEASE 24 HR ORAL EVERY 24 HOURS
Refills: 0 | Status: DISCONTINUED | OUTPATIENT
Start: 2020-04-06 | End: 2020-04-07

## 2020-04-06 RX ORDER — DIGOXIN 250 MCG
0.12 TABLET ORAL DAILY
Refills: 0 | Status: DISCONTINUED | OUTPATIENT
Start: 2020-04-06 | End: 2020-04-14

## 2020-04-06 RX ORDER — METOPROLOL TARTRATE 50 MG
25 TABLET ORAL ONCE
Refills: 0 | Status: COMPLETED | OUTPATIENT
Start: 2020-04-06 | End: 2020-04-06

## 2020-04-06 RX ORDER — DORZOLAMIDE HYDROCHLORIDE TIMOLOL MALEATE 20; 5 MG/ML; MG/ML
1 SOLUTION/ DROPS OPHTHALMIC ONCE
Refills: 0 | Status: COMPLETED | OUTPATIENT
Start: 2020-04-06 | End: 2020-04-06

## 2020-04-06 RX ORDER — ATORVASTATIN CALCIUM 80 MG/1
0 TABLET, FILM COATED ORAL
Qty: 30 | Refills: 0 | DISCHARGE

## 2020-04-06 RX ADMIN — ATORVASTATIN CALCIUM 20 MILLIGRAM(S): 80 TABLET, FILM COATED ORAL at 22:05

## 2020-04-06 RX ADMIN — POTASSIUM PHOSPHATE, MONOBASIC POTASSIUM PHOSPHATE, DIBASIC 83.33 MILLIMOLE(S): 236; 224 INJECTION, SOLUTION INTRAVENOUS at 23:40

## 2020-04-06 RX ADMIN — APIXABAN 5 MILLIGRAM(S): 2.5 TABLET, FILM COATED ORAL at 22:05

## 2020-04-06 RX ADMIN — Medication 240 MILLIGRAM(S): at 10:56

## 2020-04-06 RX ADMIN — Medication 25 MILLIGRAM(S): at 15:33

## 2020-04-06 RX ADMIN — HEPARIN SODIUM 750 UNIT(S)/HR: 5000 INJECTION INTRAVENOUS; SUBCUTANEOUS at 08:49

## 2020-04-06 RX ADMIN — APIXABAN 5 MILLIGRAM(S): 2.5 TABLET, FILM COATED ORAL at 10:58

## 2020-04-06 RX ADMIN — Medication 5 MILLIGRAM(S): at 10:56

## 2020-04-06 RX ADMIN — Medication 5 MILLIGRAM(S): at 22:06

## 2020-04-06 RX ADMIN — HEPARIN SODIUM 0 UNIT(S)/HR: 5000 INJECTION INTRAVENOUS; SUBCUTANEOUS at 08:48

## 2020-04-06 RX ADMIN — DORZOLAMIDE HYDROCHLORIDE TIMOLOL MALEATE 1 DROP(S): 20; 5 SOLUTION/ DROPS OPHTHALMIC at 21:20

## 2020-04-06 NOTE — PHYSICAL THERAPY INITIAL EVALUATION ADULT - PERTINENT HX OF CURRENT PROBLEM, REHAB EVAL
75 yo F with pmhx of HTN, CVA (on warfarin), HTN, HLD, afib (on warfarin) who presents with 2-3 week history of worsening weakness. Found to be COVID+ with afib/flutter on EKG.

## 2020-04-06 NOTE — H&P ADULT - NSHPSOCIALHISTORY_GEN_ALL_CORE
Tobacco: No current use  Alcohol: No current use  Drugs: No recreational drug use  Home: Lives at home alone, has home health aide 7 days a week, uses a walker to get around

## 2020-04-06 NOTE — H&P ADULT - NSHPLABSRESULTS_GEN_ALL_CORE
LABS:                        17.5   5.06  )-----------( 126      ( 05 Apr 2020 18:14 )             51.0     04-05    139  |  103  |  14  ----------------------------<  93  3.0<L>   |  25  |  0.89    Ca    7.9<L>      05 Apr 2020 18:14    TPro  6.9  /  Alb  3.2<L>  /  TBili  0.7  /  DBili  x   /  AST  40<H>  /  ALT  55<H>  /  AlkPhos  144<H>  04-05    PT/INR - ( 06 Apr 2020 07:01 )   PT: 14.5 sec;   INR: 1.30          PTT - ( 06 Apr 2020 07:01 )  PTT:137.1 sec      Procalcitonin:   D-dimer:   ESR:   CRP: C-Reactive Protein, Serum: 3.7 mg/dL (04-05-20 @ 18:14)    LDH:   Ferritin:   Lactate: Lactate, Blood: 1.4 mmoL/L (04-05-20 @ 18:14)  lc  Trop I: Troponin I, Serum: 0.021 ng/mL (04-05-20 @ 18:14)    Ck: Creatine Kinase, Serum: 83 U/L (04-05-20 @ 18:14)        COVID Labs  Full T cell subset:   G6PD:   Immunoglobulins panel:   Quantiferon Gold Tb:   Triglyceride level:               RADIOLOGY & ADDITIONAL TESTS: Reviewed.        CT Head:   Status post right parietal and occipital craniotomy. Right basal ganglia/external capsule chronic lacunar infarction/prior hemorrhage. Left thalamic chronic lacunar infarct. Left anterior inferior frontal encephalomalacic changes and gliosis likely due to prior trauma. No evidence of large acute intracranial hemorrhage or acute transcortical infarct.

## 2020-04-06 NOTE — H&P ADULT - PROBLEM SELECTOR PLAN 1
2-3 weak history of weakness. DDx includes dehydration vs infection with COVID-19.  - Encourage PO intake  - PT evaluation

## 2020-04-06 NOTE — H&P ADULT - HISTORY OF PRESENT ILLNESS
73 yo F with pmhx of HTN, CVA (on warfarin), HTN, HLD, afib (on warfarin) who presents with 2-3 week history of worsening weakness. Obtained history from patient through  and daughter over the phone. Patient felt like she couldnt move as well as normal the last two days. Daughter thinks that she may not be drinking enough water. Patient endorsed that over last two days she felt too weak to move, but that she has been eating normally and usually walks with a walker. Patients daughter endorsed that she had a CVA 5 years ago likely from the irregular heart rate, which required rehab. The patient lives at home alone, uses a walker to get around, has a home health aide 7 days a week and is checked on by daughter. No known sick contacts, but daughter thinks it may be home health aide since no one else visits the apartment on a regular basis. Patient is checked on by NP for warafarin levels.  Currently, the patient still feels weak in the legs and is hungry, but denies headache, nausea, vomiting, chest pain, cough, SOB, stomach pain.    ED Vitals: T 97.9, P 101, /113 RR 20 O2 95on RA ->> T100.4 P 109 /98 RR 16 O2 96 on RA  ED Course: Tylenol, heparin gtt for atrial flutter, 1 L NS, CTX, azithromycin, cardizem 10mg. Admitted to [facility] for irregular heart rate and COVID+.      Date of onset of fever: 4/6  Date of onset of dyspnea: n/a  Recent Travel: None  Sick Contacts: None  COVID Exposure: Healh aides ?  Close Contacts: Daughter, health aides    ROS:  Fevers: Y/N  Malaise: Y/N  Myalgias: Y/N  SOB: Y/N          At rest: Y/N         With exertion: Y/N         At baseline: Y/ N  Cough: Y/N         Productive: Y/N  Nausea: Y/N  Vomiting: Y/N  Diarrhea: Y/N    PMHx:   HTN: Y/N  DM: Y/N  Lung Disease: Y/N       Specify:  Cardiovascular disease: Y/N  Malignancy: Y/N  Immunosuppression: Y/N  HIV: Y/N  CKD/ESRD: Y/N  Chronic Liver Disease: Y/N    SoHx:  Tobacco use: Y/N  Vape use: Y/N  Health care worker: Y/N

## 2020-04-06 NOTE — H&P ADULT - NSHPPHYSICALEXAM_GEN_ALL_CORE
.  VITAL SIGNS:  T(C): 38.1 (04-06-20 @ 09:38), Max: 38.1 (04-06-20 @ 07:56)  T(F): 100.5 (04-06-20 @ 09:38), Max: 100.5 (04-06-20 @ 07:56)  HR: 86 (04-06-20 @ 11:43) (86 - 110)  BP: 147/71 (04-06-20 @ 11:43) (146/90 - 179/113)  BP(mean): --  RR: 20 (04-06-20 @ 11:43) (16 - 20)  SpO2: 95% (04-06-20 @ 11:43) (95% - 97%)  Wt(kg): --    PHYSICAL EXAM:  Constitutional: No acute distress, answering questions in full sentences  Head: NC/AT  Eyes: PERRL, EOMI, anicteric sclera  ENT: no nasal discharge; uvula midline, no oropharyngeal erythema or exudates; dry mucous membranes  Neck: supple; no JVD or thyromegaly  Respiratory: No accessory muscle use, speaking in full sentences  Cardiac: regular rate and irregular rhythm on monitor  Gastrointestinal: soft, NT/ND; no rebound or guarding  Extremities: WWP, no clubbing or cyanosis; no peripheral edema  Musculoskeletal: NROM x3; no joint swelling, tenderness or erythema, left arm contracted with minimal extension, chronic from stroke  Vascular: 2+ radial, femoral, DP/PT pulses B/L  Dermatologic: skin warm, dry and intact; no rashes, wounds, or scars  Neurologic: AAOx3; left arm contracted, left side facial droop chronic

## 2020-04-06 NOTE — ED ADULT NURSE REASSESSMENT NOTE - NS ED NURSE REASSESS COMMENT FT1
Repeat aPTT 137.1. Heparin drip paused per nomogram, attending Yony aware. Repeat aPTT 137.1. Heparin drip paused per nomogram, attending Yony aware. 5 uris SAMMY Sim notified and aware. Repeat aPTT 137.1. Heparin drip paused per nomogram, attending Yony aware. 5 levi RN Chiquis notified and aware. EMS is present for transfer, patient in no respiratory distress, VS per flowsheet, approved for transfer by attending Yony.

## 2020-04-06 NOTE — CONSULT NOTE ADULT - ASSESSMENT
73 yo F with pmhx of HTN, CVA (on warfarin), HTN, HLD, afib (on warfarin) who presents with 2-3 week history of worsening weakness, admitted for COVID + infection and lower extremity weakness. Cardiology consulted for afib rvr.    Please correct lytes K>4.5, Mg > 2  Known afib, continue with dig 125 mcg, diltiazem 180 mg, MTP 50 BID (home dose 100 BID)  [id number]>2, Eliquis 5 mg BID for a/c  Once rate controlled can move to [facility]    D/w Dr. Loja

## 2020-04-06 NOTE — H&P ADULT - PROBLEM SELECTOR PLAN 3
COVID-19 PCR positive  - No respiratory symptoms, saturating well on room air  - Monitor for O2 requirements  - Isolation precautions

## 2020-04-06 NOTE — PHYSICAL THERAPY INITIAL EVALUATION ADULT - DIAGNOSIS, PT EVAL
6B: Impaired Aerobic Capacity/Endurance Associated with Deconditioning 5A: Primary Prevention/Risk Reduction for Loss of Balance and Falling

## 2020-04-06 NOTE — ED ADULT NURSE REASSESSMENT NOTE - COMFORT CARE
assisted to bathroom/treatment delay explained/wait time explained/warm blanket provided/sheets and depends changed, turned and repositioned, barrier cream

## 2020-04-06 NOTE — H&P ADULT - ASSESSMENT
73 yo F with pmhx of HTN, CVA (on warfarin), HTN, HLD, afib (on warfarin) who presents with 2-3 week history of worsening weakness. Found to be COVID+ with afib/flutter on EKG.

## 2020-04-06 NOTE — PHYSICAL THERAPY INITIAL EVALUATION ADULT - ADDITIONAL COMMENTS
Patient lives in ground floor, ambulates with RW household distances, has grab bars, shower chair and raised toilet seat. Patient states she has a visiting nurse and 2 aides that split shift for 8a-5p coverage x 7 days. Patient also has w/c for community. daughter checks in on patient.
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
## 2020-04-06 NOTE — PHYSICAL THERAPY INITIAL EVALUATION ADULT - CRITERIA FOR SKILLED THERAPEUTIC INTERVENTIONS
therapy frequency/anticipated discharge recommendation/impairments found/functional limitations in following categories/rehab potential

## 2020-04-06 NOTE — H&P ADULT - PROBLEM SELECTOR PLAN 5
- Hx of CVA 5 years ago requiring anticoagulation with warfarin and rate control of afib. Patient with chronic left sided facial droop and left arm weakness  - CT Head: Status post right parietal and occipital craniotomy. Right basal ganglia/external capsule chronic lacunar infarction/prior hemorrhage. Left thalamic chronic lacunar infarct. Left anterior inferior frontal encephalomalacic changes and gliosis likely due to prior trauma. No evidence of large acute intracranial hemorrhage or acute transcortical infarct.

## 2020-04-07 LAB
ALBUMIN SERPL ELPH-MCNC: 3.7 G/DL — SIGNIFICANT CHANGE UP (ref 3.3–5)
ALP SERPL-CCNC: 126 U/L — HIGH (ref 40–120)
ALT FLD-CCNC: 42 U/L — SIGNIFICANT CHANGE UP (ref 10–45)
ANION GAP SERPL CALC-SCNC: 18 MMOL/L — HIGH (ref 5–17)
AST SERPL-CCNC: 37 U/L — SIGNIFICANT CHANGE UP (ref 10–40)
BASOPHILS # BLD AUTO: 0.02 K/UL — SIGNIFICANT CHANGE UP (ref 0–0.2)
BASOPHILS NFR BLD AUTO: 0.4 % — SIGNIFICANT CHANGE UP (ref 0–2)
BILIRUB SERPL-MCNC: 0.8 MG/DL — SIGNIFICANT CHANGE UP (ref 0.2–1.2)
BUN SERPL-MCNC: 14 MG/DL — SIGNIFICANT CHANGE UP (ref 7–23)
CALCIUM SERPL-MCNC: 8.7 MG/DL — SIGNIFICANT CHANGE UP (ref 8.4–10.5)
CHLORIDE SERPL-SCNC: 99 MMOL/L — SIGNIFICANT CHANGE UP (ref 96–108)
CO2 SERPL-SCNC: 22 MMOL/L — SIGNIFICANT CHANGE UP (ref 22–31)
CREAT SERPL-MCNC: 0.79 MG/DL — SIGNIFICANT CHANGE UP (ref 0.5–1.3)
CRP SERPL-MCNC: 7.69 MG/DL — HIGH (ref 0–0.4)
D DIMER BLD IA.RAPID-MCNC: 273 NG/ML DDU — HIGH
EOSINOPHIL # BLD AUTO: 0 K/UL — SIGNIFICANT CHANGE UP (ref 0–0.5)
EOSINOPHIL NFR BLD AUTO: 0 % — SIGNIFICANT CHANGE UP (ref 0–6)
FERRITIN SERPL-MCNC: 760 NG/ML — HIGH (ref 15–150)
GLUCOSE SERPL-MCNC: 123 MG/DL — HIGH (ref 70–99)
HCT VFR BLD CALC: 55.3 % — HIGH (ref 34.5–45)
HGB BLD-MCNC: 18.4 G/DL — HIGH (ref 11.5–15.5)
IMM GRANULOCYTES NFR BLD AUTO: 0.2 % — SIGNIFICANT CHANGE UP (ref 0–1.5)
LYMPHOCYTES # BLD AUTO: 1.18 K/UL — SIGNIFICANT CHANGE UP (ref 1–3.3)
LYMPHOCYTES # BLD AUTO: 24.2 % — SIGNIFICANT CHANGE UP (ref 13–44)
MAGNESIUM SERPL-MCNC: 1.9 MG/DL — SIGNIFICANT CHANGE UP (ref 1.6–2.6)
MCHC RBC-ENTMCNC: 29.4 PG — SIGNIFICANT CHANGE UP (ref 27–34)
MCHC RBC-ENTMCNC: 33.3 GM/DL — SIGNIFICANT CHANGE UP (ref 32–36)
MCV RBC AUTO: 88.3 FL — SIGNIFICANT CHANGE UP (ref 80–100)
MONOCYTES # BLD AUTO: 0.28 K/UL — SIGNIFICANT CHANGE UP (ref 0–0.9)
MONOCYTES NFR BLD AUTO: 5.7 % — SIGNIFICANT CHANGE UP (ref 2–14)
NEUTROPHILS # BLD AUTO: 3.39 K/UL — SIGNIFICANT CHANGE UP (ref 1.8–7.4)
NEUTROPHILS NFR BLD AUTO: 69.5 % — SIGNIFICANT CHANGE UP (ref 43–77)
NRBC # BLD: 0 /100 WBCS — SIGNIFICANT CHANGE UP (ref 0–0)
PHOSPHATE SERPL-MCNC: 2.7 MG/DL — SIGNIFICANT CHANGE UP (ref 2.5–4.5)
PLATELET # BLD AUTO: 117 K/UL — LOW (ref 150–400)
POTASSIUM SERPL-MCNC: 3.6 MMOL/L — SIGNIFICANT CHANGE UP (ref 3.5–5.3)
POTASSIUM SERPL-SCNC: 3.6 MMOL/L — SIGNIFICANT CHANGE UP (ref 3.5–5.3)
PROT SERPL-MCNC: 6.8 G/DL — SIGNIFICANT CHANGE UP (ref 6–8.3)
RBC # BLD: 6.26 M/UL — HIGH (ref 3.8–5.2)
RBC # FLD: 13.1 % — SIGNIFICANT CHANGE UP (ref 10.3–14.5)
SARS-COV-2 RNA SPEC QL NAA+PROBE: (no result)
SODIUM SERPL-SCNC: 139 MMOL/L — SIGNIFICANT CHANGE UP (ref 135–145)
WBC # BLD: 4.88 K/UL — SIGNIFICANT CHANGE UP (ref 3.8–10.5)
WBC # FLD AUTO: 4.88 K/UL — SIGNIFICANT CHANGE UP (ref 3.8–10.5)

## 2020-04-07 PROCEDURE — 99232 SBSQ HOSP IP/OBS MODERATE 35: CPT

## 2020-04-07 RX ORDER — POTASSIUM CHLORIDE 20 MEQ
40 PACKET (EA) ORAL ONCE
Refills: 0 | Status: COMPLETED | OUTPATIENT
Start: 2020-04-07 | End: 2020-04-07

## 2020-04-07 RX ORDER — DILTIAZEM HCL 120 MG
180 CAPSULE, EXT RELEASE 24 HR ORAL EVERY 24 HOURS
Refills: 0 | Status: DISCONTINUED | OUTPATIENT
Start: 2020-04-08 | End: 2020-04-14

## 2020-04-07 RX ADMIN — APIXABAN 5 MILLIGRAM(S): 2.5 TABLET, FILM COATED ORAL at 22:09

## 2020-04-07 RX ADMIN — Medication 5 MILLIGRAM(S): at 22:12

## 2020-04-07 RX ADMIN — Medication 40 MILLIEQUIVALENT(S): at 19:32

## 2020-04-07 RX ADMIN — Medication 50 MILLIGRAM(S): at 19:12

## 2020-04-07 RX ADMIN — Medication 650 MILLIGRAM(S): at 19:31

## 2020-04-07 RX ADMIN — Medication 240 MILLIGRAM(S): at 09:01

## 2020-04-07 RX ADMIN — Medication 0.12 MILLIGRAM(S): at 06:28

## 2020-04-07 RX ADMIN — Medication 650 MILLIGRAM(S): at 00:12

## 2020-04-07 RX ADMIN — APIXABAN 5 MILLIGRAM(S): 2.5 TABLET, FILM COATED ORAL at 09:02

## 2020-04-07 RX ADMIN — Medication 50 MILLIGRAM(S): at 06:29

## 2020-04-07 RX ADMIN — ATORVASTATIN CALCIUM 20 MILLIGRAM(S): 80 TABLET, FILM COATED ORAL at 22:09

## 2020-04-07 NOTE — PROGRESS NOTE ADULT - SUBJECTIVE AND OBJECTIVE BOX
Hospital Course and Transfer Note (COVID-tele to COVID-RMF):  75 yo F with pmhx of HTN, CVA (on warfarin), HTN, HLD, afib (on warfarin) who presents with 2-3 week history of worsening weakness.     INTERVAL HPI/OVERNIGHT EVENTS:    SUBJECTIVE: Patient seen and examined at bedside.    OBJECTIVE:    VITAL SIGNS:  ICU Vital Signs Last 24 Hrs  T(C): 35.8 (07 Apr 2020 06:21), Max: 38.2 (06 Apr 2020 20:18)  T(F): 96.5 (07 Apr 2020 06:21), Max: 100.7 (06 Apr 2020 20:18)  HR: 93 (07 Apr 2020 06:38) (86 - 127)  BP: 160/84 (07 Apr 2020 06:38) (147/71 - 179/91)  BP(mean): --  ABP: --  ABP(mean): --  RR: 18 (07 Apr 2020 06:38) (18 - 20)  SpO2: 96% (07 Apr 2020 06:38) (94% - 96%)        04-06 @ 07:01  -  04-07 @ 07:00  --------------------------------------------------------  IN: 798 mL / OUT: 0 mL / NET: 798 mL      CAPILLARY BLOOD GLUCOSE          PHYSICAL EXAM:    General: NAD  HEENT: NC/AT; PERRL, clear conjunctiva  Neck: supple  Respiratory: CTA b/l  Cardiovascular: +S1/S2; RRR  Abdomen: soft, NT/ND; +BS x4  Extremities: WWP, 2+ peripheral pulses b/l; no LE edema  Skin: normal color and turgor; no rash  Neurological:     MEDICATIONS:  MEDICATIONS  (STANDING):  apixaban 5 milliGRAM(s) Oral every 12 hours  atorvastatin 20 milliGRAM(s) Oral at bedtime  digoxin     Tablet 0.125 milliGRAM(s) Oral daily  diltiazem    milliGRAM(s) Oral every 24 hours  metoprolol tartrate 50 milliGRAM(s) Oral every 12 hours  oxybutynin 5 milliGRAM(s) Oral at bedtime    MEDICATIONS  (PRN):  acetaminophen   Tablet .. 650 milliGRAM(s) Oral every 6 hours PRN Temp greater or equal to 38C (100.4F)      ALLERGIES:  Allergies    No Known Allergies    Intolerances        LABS:                        17.5   6.62  )-----------( 110      ( 06 Apr 2020 17:30 )             52.3     04-06    136  |  96  |  15  ----------------------------<  106<H>  3.3<L>   |  24  |  0.77    Ca    8.8      06 Apr 2020 20:46  Phos  1.9     04-06  Mg     1.8     04-06    TPro  7.3  /  Alb  3.6  /  TBili  0.7  /  DBili  x   /  AST  see note  /  ALT  see note  /  AlkPhos  134<H>  04-06    PT/INR - ( 06 Apr 2020 07:01 )   PT: 14.5 sec;   INR: 1.30          PTT - ( 06 Apr 2020 07:01 )  PTT:137.1 sec      Procalcitonin:   D-dimer: D-Dimer Assay, Quantitative: 243 ng/mL DDU (04-06-20 @ 17:30)    ESR:   CRP: C-Reactive Protein, Serum: 5.45 mg/dL (04-06-20 @ 17:30)  C-Reactive Protein, Serum: 3.7 mg/dL (04-05-20 @ 18:14)    LDH:   Ferritin: Ferritin, Serum: 757 ng/mL (04-06-20 @ 17:30)    Lactate: lc  Trop I:   Ck: Creatine Kinase, Serum: 83 U/L (04-05-20 @ 18:14)  Creatine Kinase, Serum: 115 U/L (04-04-20 @ 23:00)        COVID Labs  Full T cell subset:   G6PD:   Immunoglobulins panel:   Quantiferon Gold Tb:   Triglyceride level:         Culture - Blood (collected 04-06-20 @ 01:41)  Source: .Blood Blood-Peripheral  Preliminary Report (04-07-20 @ 02:17):    No growth to date.    Culture - Blood (collected 04-05-20 @ 10:59)  Source: .Blood  Preliminary Report (04-06-20 @ 11:01):    No growth to date.            RADIOLOGY & ADDITIONAL TESTS: Reviewed. Hospital Course and Transfer Note (COVID-tele to COVID-RMF):  75 yo F with pmhx of HTN, CVA (on warfarin), HTN, HLD, afib (on warfarin) who presents with 2-3 week history of worsening weakness. Found to be COVID+ with afib/flutter on EKG requiring cardizem and transferred from [place] to [facility].  CT Head showing old infarcts without acute processes. Patient transitioned from warfarin (home) -> heparin gtt ([place]) -> eliquis. Cardiology consulted and patient restarted on home rate control medications digoxin, metoprolol, and diltiazem. Weakness likely from poor PO intake and deconditioning. PT consulted for evaluation. Patient has no oxygen requirements and is hemodynamically stable for stepdown to [facility] for further monitoring and management.    INTERVAL HPI/OVERNIGHT EVENTS: No acute events overnight    SUBJECTIVE: Patient seen and examined at bedside. No acute complaints.    OBJECTIVE:    VITAL SIGNS:  ICU Vital Signs Last 24 Hrs  T(C): 35.8 (07 Apr 2020 06:21), Max: 38.2 (06 Apr 2020 20:18)  T(F): 96.5 (07 Apr 2020 06:21), Max: 100.7 (06 Apr 2020 20:18)  HR: 93 (07 Apr 2020 06:38) (86 - 127)  BP: 160/84 (07 Apr 2020 06:38) (147/71 - 179/91)  BP(mean): --  ABP: --  ABP(mean): --  RR: 18 (07 Apr 2020 06:38) (18 - 20)  SpO2: 96% (07 Apr 2020 06:38) (94% - 96%)        04-06 @ 07:01  -  04-07 @ 07:00  --------------------------------------------------------  IN: 798 mL / OUT: 0 mL / NET: 798 mL      CAPILLARY BLOOD GLUCOSE          PHYSICAL EXAM:  Constitutional: No acute distress, answering questions in full sentences  Head: NC/AT  Eyes: PERRL, EOMI, anicteric sclera  ENT: no nasal discharge; uvula midline, no oropharyngeal erythema or exudates; dry mucous membranes  Neck: supple; no JVD or thyromegaly  Respiratory: No accessory muscle use, speaking in full sentences  Cardiac: regular rate and irregular rhythm on monitor  Gastrointestinal: soft, NT/ND; no rebound or guarding  Extremities: WWP, no clubbing or cyanosis; no peripheral edema  Musculoskeletal: NROM x3; no joint swelling, tenderness or erythema, left arm contracted with minimal extension, chronic from stroke  Vascular: 2+ radial, femoral, DP/PT pulses B/L  Dermatologic: skin warm, dry and intact; no rashes, wounds, or scars  Neurologic: AAOx3; left arm contracted, left side facial droop chronic          MEDICATIONS:  MEDICATIONS  (STANDING):  apixaban 5 milliGRAM(s) Oral every 12 hours  atorvastatin 20 milliGRAM(s) Oral at bedtime  digoxin     Tablet 0.125 milliGRAM(s) Oral daily  diltiazem    milliGRAM(s) Oral every 24 hours  metoprolol tartrate 50 milliGRAM(s) Oral every 12 hours  oxybutynin 5 milliGRAM(s) Oral at bedtime    MEDICATIONS  (PRN):  acetaminophen   Tablet .. 650 milliGRAM(s) Oral every 6 hours PRN Temp greater or equal to 38C (100.4F)      ALLERGIES:  Allergies    No Known Allergies    Intolerances        LABS:                        17.5   6.62  )-----------( 110      ( 06 Apr 2020 17:30 )             52.3     04-06    136  |  96  |  15  ----------------------------<  106<H>  3.3<L>   |  24  |  0.77    Ca    8.8      06 Apr 2020 20:46  Phos  1.9     04-06  Mg     1.8     04-06    TPro  7.3  /  Alb  3.6  /  TBili  0.7  /  DBili  x   /  AST  see note  /  ALT  see note  /  AlkPhos  134<H>  04-06    PT/INR - ( 06 Apr 2020 07:01 )   PT: 14.5 sec;   INR: 1.30          PTT - ( 06 Apr 2020 07:01 )  PTT:137.1 sec      Procalcitonin:   D-dimer: D-Dimer Assay, Quantitative: 243 ng/mL DDU (04-06-20 @ 17:30)    ESR:   CRP: C-Reactive Protein, Serum: 5.45 mg/dL (04-06-20 @ 17:30)  C-Reactive Protein, Serum: 3.7 mg/dL (04-05-20 @ 18:14)    LDH:   Ferritin: Ferritin, Serum: 757 ng/mL (04-06-20 @ 17:30)    Lactate: lc  Trop I:   Ck: Creatine Kinase, Serum: 83 U/L (04-05-20 @ 18:14)  Creatine Kinase, Serum: 115 U/L (04-04-20 @ 23:00)        COVID Labs  Full T cell subset:   G6PD:   Immunoglobulins panel:   Quantiferon Gold Tb:   Triglyceride level:         Culture - Blood (collected 04-06-20 @ 01:41)  Source: .Blood Blood-Peripheral  Preliminary Report (04-07-20 @ 02:17):    No growth to date.    Culture - Blood (collected 04-05-20 @ 10:59)  Source: .Blood  Preliminary Report (04-06-20 @ 11:01):    No growth to date.            RADIOLOGY & ADDITIONAL TESTS: Reviewed. Hospital Course and Transfer Note (COVID-tele to COVID-RMF):  73 yo F with pmhx of HTN, CVA (on warfarin), HTN, HLD, afib (on warfarin) who presents with 2-3 week history of worsening weakness. Found to be COVID+ with afib/flutter on EKG requiring cardizem and transferred from [place] to [facility].  CT Head showing old infarcts without acute processes. Patient transitioned from warfarin (home) -> heparin gtt ([place]) -> eliquis. Cardiology consulted and patient restarted on home rate control medications digoxin, metoprolol, and diltiazem. Weakness likely from poor PO intake and deconditioning. PT consulted for evaluation. Patient is rate controlled, has no oxygen requirements, and is hemodynamically stable for stepdown to [facility] for further monitoring and management.    INTERVAL HPI/OVERNIGHT EVENTS: No acute events overnight    SUBJECTIVE: Patient seen and examined at bedside. No acute complaints.    OBJECTIVE:    VITAL SIGNS:  ICU Vital Signs Last 24 Hrs  T(C): 35.8 (07 Apr 2020 06:21), Max: 38.2 (06 Apr 2020 20:18)  T(F): 96.5 (07 Apr 2020 06:21), Max: 100.7 (06 Apr 2020 20:18)  HR: 93 (07 Apr 2020 06:38) (86 - 127)  BP: 160/84 (07 Apr 2020 06:38) (147/71 - 179/91)  BP(mean): --  ABP: --  ABP(mean): --  RR: 18 (07 Apr 2020 06:38) (18 - 20)  SpO2: 96% (07 Apr 2020 06:38) (94% - 96%)        04-06 @ 07:01  -  04-07 @ 07:00  --------------------------------------------------------  IN: 798 mL / OUT: 0 mL / NET: 798 mL      CAPILLARY BLOOD GLUCOSE          PHYSICAL EXAM:  Constitutional: No acute distress, answering questions in full sentences  Head: NC/AT  Eyes: PERRL, EOMI, anicteric sclera  ENT: no nasal discharge; uvula midline, no oropharyngeal erythema or exudates; dry mucous membranes  Neck: supple; no JVD or thyromegaly  Respiratory: No accessory muscle use, speaking in full sentences  Cardiac: regular rate and irregular rhythm on monitor  Gastrointestinal: soft, NT/ND; no rebound or guarding  Extremities: WWP, no clubbing or cyanosis; no peripheral edema  Musculoskeletal: NROM x3; no joint swelling, tenderness or erythema, left arm contracted with minimal extension, chronic from stroke  Vascular: 2+ radial, femoral, DP/PT pulses B/L  Dermatologic: skin warm, dry and intact; no rashes, wounds, or scars  Neurologic: AAOx3; left arm contracted, left side facial droop chronic          MEDICATIONS:  MEDICATIONS  (STANDING):  apixaban 5 milliGRAM(s) Oral every 12 hours  atorvastatin 20 milliGRAM(s) Oral at bedtime  digoxin     Tablet 0.125 milliGRAM(s) Oral daily  diltiazem    milliGRAM(s) Oral every 24 hours  metoprolol tartrate 50 milliGRAM(s) Oral every 12 hours  oxybutynin 5 milliGRAM(s) Oral at bedtime    MEDICATIONS  (PRN):  acetaminophen   Tablet .. 650 milliGRAM(s) Oral every 6 hours PRN Temp greater or equal to 38C (100.4F)      ALLERGIES:  Allergies    No Known Allergies    Intolerances        LABS:                        17.5   6.62  )-----------( 110      ( 06 Apr 2020 17:30 )             52.3     04-06    136  |  96  |  15  ----------------------------<  106<H>  3.3<L>   |  24  |  0.77    Ca    8.8      06 Apr 2020 20:46  Phos  1.9     04-06  Mg     1.8     04-06    TPro  7.3  /  Alb  3.6  /  TBili  0.7  /  DBili  x   /  AST  see note  /  ALT  see note  /  AlkPhos  134<H>  04-06    PT/INR - ( 06 Apr 2020 07:01 )   PT: 14.5 sec;   INR: 1.30          PTT - ( 06 Apr 2020 07:01 )  PTT:137.1 sec      Procalcitonin:   D-dimer: D-Dimer Assay, Quantitative: 243 ng/mL DDU (04-06-20 @ 17:30)    ESR:   CRP: C-Reactive Protein, Serum: 5.45 mg/dL (04-06-20 @ 17:30)  C-Reactive Protein, Serum: 3.7 mg/dL (04-05-20 @ 18:14)    LDH:   Ferritin: Ferritin, Serum: 757 ng/mL (04-06-20 @ 17:30)    Lactate: lc  Trop I:   Ck: Creatine Kinase, Serum: 83 U/L (04-05-20 @ 18:14)  Creatine Kinase, Serum: 115 U/L (04-04-20 @ 23:00)        COVID Labs  Full T cell subset:   G6PD:   Immunoglobulins panel:   Quantiferon Gold Tb:   Triglyceride level:         Culture - Blood (collected 04-06-20 @ 01:41)  Source: .Blood Blood-Peripheral  Preliminary Report (04-07-20 @ 02:17):    No growth to date.    Culture - Blood (collected 04-05-20 @ 10:59)  Source: .Blood  Preliminary Report (04-06-20 @ 11:01):    No growth to date.            RADIOLOGY & ADDITIONAL TESTS: Reviewed.
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
## 2020-04-07 NOTE — PROGRESS NOTE ADULT - ATTENDING COMMENTS
Assessment on date 04-07-20 Patient personally seen and examined myself during rounds, face-to-face, with the Resident  Note read, including vitals, physical findings, laboratory data, and radiological reports.   Agree with above with revisions, if any included below.     - Chief Complaint: follow up for sob     - Laboratory data reviewed by me    -My exam:    Neck: no JVD  Cardiovascular: RRR S1S2 no murmurs  Respiratory: CTA B/L no rales  Extremities: no edema, no cyanosis    - Diagnosis and Plan:  Hyperlipidemia: - stable, continue atorvastatin 20mg   HTN (hypertension): - BP at goal, continue to monitor  Atrial fibrillation: continue with dig 125 mcg, diltiazem 180 mg    -Plan discussed with  Medicine team regarding medical mgmt of Afib

## 2020-04-07 NOTE — PROGRESS NOTE ADULT - PROBLEM SELECTOR PLAN 5
- Hx of CVA 5 years ago requiring anticoagulation with warfarin and rate control of afib. Patient with chronic left sided facial droop and left arm weakness  - CT Head: Status post right parietal and occipital craniotomy. Right basal ganglia/external capsule chronic lacunar infarction/prior hemorrhage. Left thalamic chronic lacunar infarct. Left anterior inferior frontal encephalomalacic changes and gliosis likely due to prior trauma. No evidence of large acute intracranial hemorrhage or acute transcortical infarct. - Hx of CVA 5 years ago requiring anticoagulation with warfarin and rate control of afib. Patient with chronic left sided facial droop and left arm weakness  - CT Head: Status post right parietal and occipital craniotomy. Right basal ganglia/external capsule chronic lacunar infarction/prior hemorrhage. Left thalamic chronic lacunar infarct. Left anterior inferior frontal encephalomalacic changes and gliosis likely due to prior trauma. No evidence of large acute intracranial hemorrhage or acute transcortical infarct.  - see above

## 2020-04-07 NOTE — PROGRESS NOTE ADULT - PROBLEM SELECTOR PLAN 2
Hx of atrial fibrillation diagnosed 5 years ago after patients stroke. Home meds include wafarin, digoxin, diltiazem, metoprolol, atorvastatin.  - Increased diltizaem to 240mg daily  - Lopressor 5mg IV q6h for 2 doses  - Consider restarting metoprolol 100mg, digoxin 0.125mg daily  - Patient on warfarin at home, received heparin gtt  - Started eliquis 5mg BID Hx of atrial fibrillation diagnosed 5 years ago after patients stroke. Home meds include wafarin, digoxin, diltiazem, metoprolol, atorvastatin.  - c/w home dose diltizaem to 180mg daily  - c/w Lopressor 50mg BID, can titrate up per cardiology  - c/w digoxin 0.125mg daily  - Started eliquis 5mg BID

## 2020-04-07 NOTE — PROGRESS NOTE ADULT - PROBLEM SELECTOR PLAN 7
Cystoscopy    Cystoscopy is a procedure that lets your doctor look directly inside your urethra and bladder. It can be used to:  · Help diagnose a problem with your urethra, bladder, or kidneys.  · Take a sample (biopsy) of bladder or urethral tissue.  · Treat certain problems (such as removing kidney stones).  · Place a stent to bypass an obstruction.  · Take special X-rays of the kidneys.  Based on the findings, your doctor may recommend other tests or treatments.  What is a cystoscope?  A cystoscope is a telescope-like instrument that contains lenses and fiberoptics (small glass wires that make bright light). The cystoscope may be straight and rigid, or flexible to bend around curves in the urethra. The doctor may look directly into the cystoscope, or project the image onto a monitor.  Getting ready  · Ask your doctor if you should stop taking any medicines before the procedure.  · Ask whether you should avoid eating or drinking anything after midnight before the procedure.  · Follow any other instructions your doctor gives you.  Tell your doctor before the exam if you:  · Take any medicines, such as aspirin or blood thinners  · Have allergies to any medicines  · Are pregnant   The procedure  Cystoscopy is done in the doctors office, surgery center, or hospital. The doctor and a nurse are present during the procedure. It takes only a few minutes, longer if a biopsy, X-ray, or treatment needs to be done.  During the procedure:  · You lie on an exam table on your back, knees bent and legs apart. You are covered with a drape.  · Your urethra and the area around it are washed. Anesthetic jelly may be applied to numb the urethra. Other pain medicine is usually not needed. In some cases, you may be offered a mild sedative to help you relax. If a more extensive procedure is to be done, such as a biopsy or kidney stone removal, general anesthesia may be needed.  · The cystoscope is inserted. A sterile fluid is put  into the bladder to expand it. You may feel pressure from this fluid.  · When the procedure is done, the cystoscope is removed.  After the procedure  If you had a sedative, general anesthesia, or spinal anesthesia, you must have someone drive you home. Once youre home:  · Drink plenty of fluids.  · You may have burning or light bleeding when you urinate--this is normal.  · Medicines may be prescribed to ease any discomfort or prevent infection. Take these as directed.  · Call your doctor if you have heavy bleeding or blood clots, burning that lasts more than a day, a fever over 100°F  (38° C), or trouble urinating.  Date Last Reviewed: 1/1/2017  © 0187-4504 [employer] [address] [place] [address], [place] [address]. All rights reserved. This information is not intended as a substitute for professional medical care. Always follow your healthcare professional's instructions.           - c/w atorvastatin 20mg qhs

## 2020-04-07 NOTE — PROGRESS NOTE ADULT - ASSESSMENT
75 yo F with pmhx of HTN, CVA (on warfarin), HTN, HLD, afib (on warfarin) who presents with 2-3 week history of worsening weakness, admitted for COVID + infection and lower extremity weakness. Cardiology consulted for afib rvr.    Please correct lytes K>4.5, Mg > 2  Known afib, continue with dig 125 mcg, diltiazem 180 mg  Continue with MTP 50 BID (home dose 100 BID) in setting of acute infection  [id number], Eliquis 5 mg BID for a/c  Rate controlled, no need for telemetry    D/w Dr. Hunt

## 2020-04-08 LAB — GLUCOSE BLDC GLUCOMTR-MCNC: 115 MG/DL — HIGH (ref 70–99)

## 2020-04-08 PROCEDURE — 99232 SBSQ HOSP IP/OBS MODERATE 35: CPT

## 2020-04-08 PROCEDURE — 99221 1ST HOSP IP/OBS SF/LOW 40: CPT

## 2020-04-08 RX ORDER — MAGNESIUM OXIDE 400 MG ORAL TABLET 241.3 MG
400 TABLET ORAL ONCE
Refills: 0 | Status: COMPLETED | OUTPATIENT
Start: 2020-04-08 | End: 2020-04-08

## 2020-04-08 RX ORDER — POTASSIUM CHLORIDE 20 MEQ
40 PACKET (EA) ORAL ONCE
Refills: 0 | Status: COMPLETED | OUTPATIENT
Start: 2020-04-08 | End: 2020-04-08

## 2020-04-08 RX ORDER — FAMOTIDINE 10 MG/ML
20 INJECTION INTRAVENOUS DAILY
Refills: 0 | Status: DISCONTINUED | OUTPATIENT
Start: 2020-04-08 | End: 2020-04-14

## 2020-04-08 RX ADMIN — Medication 50 MILLIGRAM(S): at 07:46

## 2020-04-08 RX ADMIN — Medication 180 MILLIGRAM(S): at 11:00

## 2020-04-08 RX ADMIN — FAMOTIDINE 20 MILLIGRAM(S): 10 INJECTION INTRAVENOUS at 11:01

## 2020-04-08 RX ADMIN — Medication 650 MILLIGRAM(S): at 07:57

## 2020-04-08 RX ADMIN — APIXABAN 5 MILLIGRAM(S): 2.5 TABLET, FILM COATED ORAL at 11:01

## 2020-04-08 RX ADMIN — APIXABAN 5 MILLIGRAM(S): 2.5 TABLET, FILM COATED ORAL at 22:02

## 2020-04-08 RX ADMIN — Medication 5 MILLIGRAM(S): at 22:02

## 2020-04-08 RX ADMIN — MAGNESIUM OXIDE 400 MG ORAL TABLET 400 MILLIGRAM(S): 241.3 TABLET ORAL at 11:01

## 2020-04-08 RX ADMIN — Medication 40 MILLIEQUIVALENT(S): at 11:01

## 2020-04-08 RX ADMIN — Medication 50 MILLIGRAM(S): at 17:40

## 2020-04-08 RX ADMIN — Medication 0.12 MILLIGRAM(S): at 07:46

## 2020-04-08 RX ADMIN — ATORVASTATIN CALCIUM 20 MILLIGRAM(S): 80 TABLET, FILM COATED ORAL at 22:02

## 2020-04-08 NOTE — PROGRESS NOTE ADULT - ASSESSMENT
Patient is stable from cardiac viewpoint.  HR controlled with current regimen.  Pulmonary wise: is doing well.  On RA.  Markers are up but a small amount.   To be transferred to [facility].    Major concern is that she is not been OOB and had little/no PT.  Have requested a walker.  Needs in bed ROM exercises at least.  Low grade fever noted.  Not on antibiotcs.  I spoke with daughter who is concerned about PT and ambulation  Also need to encourage po diet.

## 2020-04-08 NOTE — DISCHARGE NOTE PROVIDER - NSDCMRMEDTOKEN_GEN_ALL_CORE_FT
APAP/CODEINE -30MG:   ATORVASTATIN CALCIUM  20 MG TABS:   DIGOX  125 MCG TABS:   DILTIAZEM HYDROCHLORIDE ER  180 MG CP24:   DORZOL/TIMOL SOL 22.3-6.8:   METOPROLOL TARTRATE  100 MG TABS:   OXYBUTYNIN CHLORIDE  5 MG TABS:   WARFARIN SODIUM  3 MG TABS: APAP/CODEINE -30MG:   apixaban 5 mg oral tablet: 1 tab(s) orally every 12 hours  ATORVASTATIN CALCIUM  20 MG TABS:   digoxin 125 mcg (0.125 mg) oral tablet: 1 tab(s) orally once a day  dilTIAZem 180 mg/24 hours oral capsule, extended release: 1 cap(s) orally every 24 hours  DORZOL/TIMOL SOL 22.3-6.8:   metoprolol tartrate 100 mg oral tablet: 2 tab(s) orally every 12 hours  oxybutynin 5 mg oral tablet: 1 tab(s) orally once a day (at bedtime)  polyethylene glycol 3350 oral powder for reconstitution: 17 gram(s) orally 2 times a day, As needed, Constipation

## 2020-04-08 NOTE — DISCHARGE NOTE PROVIDER - NSDCCPCAREPLAN_GEN_ALL_CORE_FT
PRINCIPAL DISCHARGE DIAGNOSIS  Diagnosis: COVID-19 virus infection  Assessment and Plan of Treatment: You were diagnosed with the new COVID-19 coronavirus infection via a nasal swab. The treatment for this infection is supportive care, which includes: rest, maintaining adequate oral intake of food and water, and taking acetaminophen/tylenol for fever. Please maintain a strict home quarantine for 14 days at home, and wear a surgical mask at all times when you need to be in close proximity with another human being. Take tylenol as needed, every 6 hours, with a maximum daily dose of 4,000 mg a day. Please visit your nearest urgent care or emergency department should you start to experience: severe shortness of breath, severe cough/wheezing/difficulty breathing, or fever >103 for 3 days. Please maintain a good healthy diet. If you have any questions, please call your primary care provider.      SECONDARY DISCHARGE DIAGNOSES  Diagnosis: AF (atrial fibrillation)  Assessment and Plan of Treatment: You have a known diagnosis of atrial fibrillation prior to your admission. This condition, if not treated, increases your risk of stroke or heart attack. Please continue to take _____. Please make sure to continue taking these medications to avoid developing blood clots and to lower your risk of stroke. Additionally be sure to follow up with your primary care physician (and/or cardiologist) on a regular basis to ensure that this condition does not require changes to the dose or type of medication.

## 2020-04-08 NOTE — PROGRESS NOTE ADULT - ATTENDING COMMENTS
Assessment on date 04-08-20 Patient personally seen and examined myself during rounds, face-to-face, with the Resident  Note read, including vitals, physical findings, laboratory data, and radiological reports.   Agree with above with revisions, if any included below.     - Chief Complaint: follow up for sob     - Laboratory data reviewed by me    -My exam:    Neck: no JVD  Cardiovascular: RRR S1S2 no murmurs  Respiratory: CTA B/L no rales  Extremities: no edema, no cyanosis    - Diagnosis and Plan:  Hyperlipidemia: - stable, continue atorvastatin 20mg   HTN (hypertension): - Continue with MTP 50 BID (home dose 100 BID) in setting of acute infection  Atrial fibrillation: continue with dig 125 mcg, diltiazem 180 mg      -Plan discussed with  Medicine team regarding medical mgmt of Afib .

## 2020-04-08 NOTE — PROGRESS NOTE ADULT - SUBJECTIVE AND OBJECTIVE BOX
HD#3  Hx of CVA 5 years ago and HTN, Afib. On coumadin and diltiazem, metoprolol, atorvastatin, digoxin. Lives with 2 HHA and Ambulates with walker. Left arm is weak post stroke and last 2 weeks moving it less and has had less ROM.  Admitted with rapid HR to telemetry 3 days ago.  Febrile to101 on admission Noted that she had been very weak and not ambulating or eating much x 2 weeks prior admit.  Was noted to be COVID + in hosptal. No known significant pulmonary issues.    In hospital HR is controlled.    On Regular diet but not taking much in.  No N/V.    Not been OOB    Vital Signs Last 24 Hrs  T(C): 37.8 (08 Apr 2020 10:10), Max: 38.9 (07 Apr 2020 19:30)  T(F): 100.1 (08 Apr 2020 10:10), Max: 102.1 (07 Apr 2020 19:30)  HR: 89 (08 Apr 2020 10:10) (89 - 120)  BP: 118/74 (08 Apr 2020 10:10) (118/74 - 158/89)  BP(mean): --  RR: 20 (08 Apr 2020 10:10) (18 - 21)  SpO2: 94% (08 Apr 2020 10:10) (94% - 98%)    lungs clear  cor S1S2  abd: benign, non tender, BS+  ext: pulses distally, no edema, non-tender                          18.4   4.88  )-----------( 117      ( 07 Apr 2020 09:57 )             55.3   04-07    139  |  99  |  14  ----------------------------<  123<H>  3.6   |  22  |  0.79    Ca    8.7      07 Apr 2020 09:57  Phos  2.7     04-07  Mg     1.9     04-07    TPro  6.8  /  Alb  3.7  /  TBili  0.8  /  DBili  x   /  AST  37  /  ALT  42  /  AlkPhos  126<H>  04-07    D dimer 272 (243)  CRP 7.69 (5.45)  Ferritin 760 (757)

## 2020-04-08 NOTE — PROGRESS NOTE ADULT - PROBLEM SELECTOR PLAN 1
2-3 weak history of weakness. DDx includes dehydration vs infection with COVID-19.  - Encourage PO intake  - PT evaluation, recommended for JANICE

## 2020-04-08 NOTE — PROGRESS NOTE ADULT - SUBJECTIVE AND OBJECTIVE BOX
[facility] Adult Transfer Note    Hospital Course: 74F with PMH of HTN, CVA (on warfarin), HTN, HLD, afib (on warfarin at home), who presented with 2-3 week history of worsening weakness. Patient was found to be COVID+ with afib/flutter on EKG requiring Cardizem and transferred from [place] to [facility]. CT Head showed evidence of old infarcts without acute processes. Patient did not receive steroids or toci but received one dose of azithromycin (symptoms not severe enough to warrant). Patient was transitioned from warfarin (home) to heparin gtt at [facility] and eventually to Eliquis. Cardiology consulted, and patient was restarted on home rate control medications (digoxin, metoprolol, and diltiazem). Weakness was attributed to poor PO intake and deconditioning. PT was consulted for evaluation and worked with patient (recommended for JANICE). Patient is now rate controlled, has no oxygen requirements, and is hemodynamically optimized for stepdown to COVID [facility] for further monitoring and management. If patient were to need further rate control, could be up-titrated on Lopressor as is only receiving 50 mg BID in setting of acute infection and home dose is 100 mg BID. Patient was switched from warfarin to eliquis 5mg PO BID during this admission.    	  MEDICATIONS:  digoxin     Tablet 0.125 milliGRAM(s) Oral daily  diltiazem    milliGRAM(s) Oral every 24 hours  metoprolol tartrate 50 milliGRAM(s) Oral every 12 hours  acetaminophen   Tablet .. 650 milliGRAM(s) Oral every 6 hours PRN  famotidine    Tablet 20 milliGRAM(s) Oral daily  atorvastatin 20 milliGRAM(s) Oral at bedtime  apixaban 5 milliGRAM(s) Oral every 12 hours  oxybutynin 5 milliGRAM(s) Oral at bedtime        [PHYSICAL EXAM:  TELEMETRY:  T(C): 37.8 (04-08-20 @ 10:10), Max: 38.9 (04-07-20 @ 19:30)  HR: 89 (04-08-20 @ 10:10) (89 - 120)  BP: 118/74 (04-08-20 @ 10:10) (118/74 - 158/89)  RR: 20 (04-08-20 @ 10:10) (18 - 21)  SpO2: 94% (04-08-20 @ 10:10) (94% - 98%)  I&O's Summary    07 Apr 2020 07:01  -  08 Apr 2020 07:00  --------------------------------------------------------  IN: 0 mL / OUT: 600 mL / NET: -600 mL                                   Appearance: NAD  HEENT:   Normal oral mucosa, PERRL, EOMI	  Neck: Supple, - JVD  Cardiovascular: Normal S1 S2, No JVD, No murmurs,   Respiratory: Lungs clear to auscultation  Gastrointestinal:  Soft, Non-tender, + BS	  Skin: No rashes, No ecchymoses, No cyanosis  Extremities: Normal range of motion, No clubbing, cyanosis or edema  Neurologic: Non-focal  Psychiatry: A & O x 3, Mood & affect appropriate      LABS:	 	               18.4   4.88  )-----------( 117      ( 07 Apr 2020 09:57 )             55.3     04-07    139  |  99  |  14  ----------------------------<  123<H>  3.6   |  22  |  0.79    Ca    8.7      07 Apr 2020 09:57  Phos  2.7     04-07  Mg     1.9     04-07    TPro  6.8  /  Alb  3.7  /  TBili  0.8  /  DBili  x   /  AST  37  /  ALT  42  /  AlkPhos  126<H>  04-07

## 2020-04-08 NOTE — PROGRESS NOTE ADULT - ASSESSMENT
73 yo F with pmhx of HTN, CVA (on warfarin), HTN, HLD, afib (on warfarin) who presents with 2-3 week history of worsening weakness. Found to be COVID+ with afib/flutter on EKG. 74F with PMH of HTN, CVA (on warfarin), HTN, HLD, afib (on warfarin), presented with 2-3 week history of worsening weakness. Found to be COVID+, course complicated by afib/flutter on EKG so on COVID tele. Now s/p rate control and anti-coagulated with Eliquis so optimized for step-down to [facility].
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
## 2020-04-08 NOTE — PROGRESS NOTE ADULT - SUBJECTIVE AND OBJECTIVE BOX
Hospital Course and Transfer Note ([facility] to [facility]):    HOSPITAL COURSE:   74F with PMH of HTN, CVA (on warfarin), HTN, HLD, afib (on warfarin), who presented with 2-3 week history of worsening weakness. Patient was found to be COVID+ with afib/flutter on EKG requiring Cardizem and transferred from [place] to [facility]. CT Head showed evidence of old infarcts without acute processes. Patient did not receive steroids or toci but received one dose of azithromycin. Patient was transitioned from warfarin (home) to heparin gtt at [facility] and eventually to [facility]. Cardiology consulted, and patient was restarted on home rate control medications (digoxin, metoprolol, and diltiazem). Weakness was attributed to poor PO intake and deconditioning. PT was consulted for evaluation and worked with patient. Patient is now rate controlled, has no oxygen requirements, and is hemodynamically optimized for stepdown to [facility] for further monitoring and management. If patient were to need further rate control, could be up-titrated on Lopressor as is only receiving 50 mg BID currently.     VITAL SIGNS:  Vital Signs Last 24 Hrs  T(C): 37.2 (08 Apr 2020 01:34), Max: 38.9 (07 Apr 2020 19:30)  T(F): 99 (08 Apr 2020 01:34), Max: 102.1 (07 Apr 2020 19:30)  HR: 91 (08 Apr 2020 01:34) (85 - 120)  BP: 130/73 (08 Apr 2020 01:34) (130/73 - 160/84)  BP(mean): --  RR: 19 (08 Apr 2020 01:34) (18 - 19)  SpO2: 96% (08 Apr 2020 01:34) (95% - 96%)    04-06-20 @ 07:01  -  04-07-20 @ 07:00  --------------------------------------------------------  IN: 798 mL / OUT: 0 mL / NET: 798 mL    04-07-20 @ 07:01  -  04-08-20 @ 01:46  --------------------------------------------------------  IN: 0 mL / OUT: 600 mL / NET: -600 mL      PHYSICAL EXAM:  *Physical exam deferred to limit exposure.     MEDICATIONS:  MEDICATIONS  (STANDING):  apixaban 5 milliGRAM(s) Oral every 12 hours  atorvastatin 20 milliGRAM(s) Oral at bedtime  digoxin     Tablet 0.125 milliGRAM(s) Oral daily  diltiazem    milliGRAM(s) Oral every 24 hours  metoprolol tartrate 50 milliGRAM(s) Oral every 12 hours  oxybutynin 5 milliGRAM(s) Oral at bedtime    MEDICATIONS  (PRN):  acetaminophen   Tablet .. 650 milliGRAM(s) Oral every 6 hours PRN Temp greater or equal to 38C (100.4F)      ALLERGIES:  Allergies    No Known Allergies    Intolerances        LABS:                        18.4   4.88  )-----------( 117      ( 07 Apr 2020 09:57 )             55.3     04-07    139  |  99  |  14  ----------------------------<  123<H>  3.6   |  22  |  0.79    Ca    8.7      07 Apr 2020 09:57  Phos  2.7     04-07  Mg     1.9     04-07    TPro  6.8  /  Alb  3.7  /  TBili  0.8  /  DBili  x   /  AST  37  /  ALT  42  /  AlkPhos  126<H>  04-07    PT/INR - ( 06 Apr 2020 07:01 )   PT: 14.5 sec;   INR: 1.30          PTT - ( 06 Apr 2020 07:01 )  PTT:137.1 sec    CAPILLARY BLOOD GLUCOSE      RADIOLOGY & ADDITIONAL TESTS:   Reviewed.  No new imaging. Hospital Course and Transfer Note ([facility] to [facility]):    HOSPITAL COURSE:   74F with PMH of HTN, CVA (on warfarin), HTN, HLD, afib (on warfarin), who presented with 2-3 week history of worsening weakness. Patient was found to be COVID+ with afib/flutter on EKG requiring Cardizem and transferred from [place] to [facility]. CT Head showed evidence of old infarcts without acute processes. Patient did not receive steroids or toci but received one dose of azithromycin. Patient was transitioned from warfarin (home) to heparin gtt at [facility] and eventually to [facility]. Cardiology consulted, and patient was restarted on home rate control medications (digoxin, metoprolol, and diltiazem). Weakness was attributed to poor PO intake and deconditioning. PT was consulted for evaluation and worked with patient. Patient is now rate controlled, has no oxygen requirements, and is hemodynamically optimized for stepdown to [facility] for further monitoring and management. If patient were to need further rate control, could be up-titrated on Lopressor as is only receiving 50 mg BID in setting of acute infection and home dose is 100 mg BID.     VITAL SIGNS:  Vital Signs Last 24 Hrs  T(C): 37.2 (08 Apr 2020 01:34), Max: 38.9 (07 Apr 2020 19:30)  T(F): 99 (08 Apr 2020 01:34), Max: 102.1 (07 Apr 2020 19:30)  HR: 91 (08 Apr 2020 01:34) (85 - 120)  BP: 130/73 (08 Apr 2020 01:34) (130/73 - 160/84)  BP(mean): --  RR: 19 (08 Apr 2020 01:34) (18 - 19)  SpO2: 96% (08 Apr 2020 01:34) (95% - 96%)    04-06-20 @ 07:01  -  04-07-20 @ 07:00  --------------------------------------------------------  IN: 798 mL / OUT: 0 mL / NET: 798 mL    04-07-20 @ 07:01  -  04-08-20 @ 01:46  --------------------------------------------------------  IN: 0 mL / OUT: 600 mL / NET: -600 mL      PHYSICAL EXAM:  *Physical exam deferred to limit exposure.     MEDICATIONS:  MEDICATIONS  (STANDING):  apixaban 5 milliGRAM(s) Oral every 12 hours  atorvastatin 20 milliGRAM(s) Oral at bedtime  digoxin     Tablet 0.125 milliGRAM(s) Oral daily  diltiazem    milliGRAM(s) Oral every 24 hours  metoprolol tartrate 50 milliGRAM(s) Oral every 12 hours  oxybutynin 5 milliGRAM(s) Oral at bedtime    MEDICATIONS  (PRN):  acetaminophen   Tablet .. 650 milliGRAM(s) Oral every 6 hours PRN Temp greater or equal to 38C (100.4F)      ALLERGIES:  Allergies    No Known Allergies    Intolerances        LABS:                        18.4   4.88  )-----------( 117      ( 07 Apr 2020 09:57 )             55.3     04-07    139  |  99  |  14  ----------------------------<  123<H>  3.6   |  22  |  0.79    Ca    8.7      07 Apr 2020 09:57  Phos  2.7     04-07  Mg     1.9     04-07    TPro  6.8  /  Alb  3.7  /  TBili  0.8  /  DBili  x   /  AST  37  /  ALT  42  /  AlkPhos  126<H>  04-07    PT/INR - ( 06 Apr 2020 07:01 )   PT: 14.5 sec;   INR: 1.30          PTT - ( 06 Apr 2020 07:01 )  PTT:137.1 sec    CAPILLARY BLOOD GLUCOSE      RADIOLOGY & ADDITIONAL TESTS:   Reviewed.  No new imaging.

## 2020-04-08 NOTE — PROGRESS NOTE ADULT - PROBLEM SELECTOR PLAN 2
Hx of atrial fibrillation diagnosed 5 years ago after patients stroke. Home meds include wafarin, digoxin, diltiazem, metoprolol, atorvastatin. [id number], so warranting Eliquis 5 mg BID for anti-coagulation.   - c/w home dose Diltiazem 180mg daily  - c/w Lopressor 50 mg BID, can titrate up per cardiology  - c/w digoxin 0.125 mg daily  - c/w Eliquis 5mg BID

## 2020-04-08 NOTE — PROGRESS NOTE ADULT - PROBLEM SELECTOR PLAN 2
Hx of atrial fibrillation diagnosed 5 years ago after patients stroke. Home meds include wafarin, digoxin, diltiazem, metoprolol, atorvastatin.  - c/w home dose Diltiazem 180mg daily  - c/w Lopressor 50mg BID, can titrate up per cardiology  - c/w digoxin 0.125mg daily  - c/w eliquis 5mg BID Hx of atrial fibrillation diagnosed 5 years ago after patients stroke. Home meds include wafarin, digoxin, diltiazem, metoprolol, atorvastatin. [id number], so warranting Eliquis 5 mg BID for anti-coagulation.   - c/w home dose Diltiazem 180mg daily  - c/w Lopressor 50 mg BID, can titrate up per cardiology  - c/w digoxin 0.125 mg daily  - c/w Eliquis 5mg BID

## 2020-04-08 NOTE — PROGRESS NOTE ADULT - PROBLEM SELECTOR PLAN 8
F: None  E: Replete PRN  N: Soft, regular F: None  E: Please correct lytes K>4.5, Mg > 2 in setting of a fib   N: Soft, regular

## 2020-04-08 NOTE — PROGRESS NOTE ADULT - PROBLEM SELECTOR PLAN 3
COVID-19 PCR positive.  - No respiratory symptoms, saturating well on room air  - Monitor for O2 requirements  - Isolation precautions  - CRP, d-dimer, and ferritin downtrending

## 2020-04-08 NOTE — PROGRESS NOTE ADULT - ASSESSMENT
75 yo F with pmhx of HTN, CVA (on warfarin), HTN, HLD, afib (on warfarin) who presents with 2-3 week history of worsening weakness, admitted for COVID + infection and lower extremity weakness. Cardiology consulted for afib.    Please keep lytes K>4.5, Mg > 2  Known afib, continue with dig 125 mcg, diltiazem 180 mg  Continue with MTP 50 BID (home dose 100 BID) in setting of acute infection  [id number], Eliquis 5 mg BID for a/c    D/w Dr. Hunt
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
## 2020-04-08 NOTE — PROGRESS NOTE ADULT - PROBLEM SELECTOR PLAN 5
- Hx of CVA 5 years ago requiring anticoagulation with warfarin and rate control of afib. Patient with chronic left sided facial droop and left arm weakness  - CT Head: Status post right parietal and occipital craniotomy. Right basal ganglia/external capsule chronic lacunar infarction/prior hemorrhage. Left thalamic chronic lacunar infarct. Left anterior inferior frontal encephalomalacic changes and gliosis likely due to prior trauma. No evidence of large acute intracranial hemorrhage or acute transcortical infarct.  - management otherwise as stated above

## 2020-04-08 NOTE — DISCHARGE NOTE PROVIDER - HOSPITAL COURSE
#Discharge: do not delete        Patient is __ yo M/F with past medical history of _____    Presented with _____, found to have _____    Problem List/Main Diagnoses (system-based):         Inpatient treatment course:     74F with PMH of HTN, CVA (on warfarin), HTN, HLD, afib (on warfarin), who presented with 2-3 week history of worsening weakness. Patient was found to be COVID+ with afib/flutter on EKG requiring Cardizem and transferred from [place] to [facility]. CT Head showed evidence of old infarcts without acute processes. Patient did not receive steroids or toci but received one dose of azithromycin. Patient was transitioned from warfarin (home) to heparin gtt at [facility] and eventually to [facility]. Cardiology consulted, and patient was restarted on home rate control medications (digoxin, metoprolol, and diltiazem). Weakness was attributed to poor PO intake and deconditioning. PT was consulted for evaluation and worked with patient. Patient is now rate controlled, has no oxygen requirements, and is hemodynamically optimized for stepdown to COVID RMF for further monitoring and management. If patient were to need further rate control, could be up-titrated on Lopressor as is only receiving 50 mg BID in setting of acute infection and home dose is 100 mg BID.         New medications:     Labs to be followed outpatient:     Exam to be followed outpatient: #Discharge: do not delete        Patient is __ yo M/F with past medical history of _____    Presented with _____, found to have _____    Problem List/Main Diagnoses (system-based):         Inpatient treatment course:     74F with PMH of HTN, CVA (on warfarin), HTN, HLD, afib (on warfarin), who presented with 2-3 week history of worsening weakness. Patient was found to be COVID+ with afib/flutter on EKG requiring Cardizem and transferred from [place] to [facility]. CT Head showed evidence of old infarcts without acute processes. Patient did not receive steroids or toci but received one dose of azithromycin. Patient was transitioned from warfarin (home) to heparin gtt at [facility] and eventually to [facility]. Cardiology consulted, and patient was restarted on home rate control medications (digoxin, metoprolol, and diltiazem). Weakness was attributed to poor PO intake and deconditioning. PT was consulted for evaluation and worked with patient. Patient is now rate controlled, has no oxygen requirements, and is hemodynamically optimized for stepdown to COVID RMF for further monitoring and management. If patient were to need further rate control, could be up-titrated on Lopressor as is only receiving 50 mg BID in setting of acute infection and home dose is 100 mg BID.

## 2020-04-08 NOTE — PROGRESS NOTE ADULT - ASSESSMENT
74F with PMH of HTN, CVA (on warfarin), HTN, HLD, afib (on warfarin), presented with 2-3 week history of worsening weakness. Found to be COVID+, course complicated by afib/flutter on EKG so on COVID tele. Now s/p rate control and anti-coagulated with Eliquis so optimized for transfer to CAP unit.

## 2020-04-08 NOTE — PROGRESS NOTE ADULT - PROBLEM SELECTOR PLAN 9
DVT Ppx: Eliquis 5mg BID  GI Ppx: None   Dispo: COVID-Tele DVT Ppx: Eliquis 5mg BID  GI Ppx: None   Dispo: LAWANDA

## 2020-04-08 NOTE — DISCHARGE NOTE PROVIDER - NSDCFUADDINST_GEN_ALL_CORE_FT
Please continue to advance your activity as tolerated, keeping yourself as active as possible, and continue with any strengthening exercises that you may tolerate. There was concern that you may have the novel new coronavirus, also known as COVID-19. This test resulted positive from a nasal and oral (mouth) swab that was done earlier in your admission to [facility]. Your symptoms improved dramatically during your hospital stay. Should your test result be positive, the treatment is supportive care, which means: rest, hydration, and maintaining a good healthy diet. You may take tylenol if you experience any fevers and Robitussin for cough. If you start experiencing extreme shortness of breath, difficulty breathing resulting in the inability to even walk, please visit an urgent care. Please maintain a 2 week (14 day) quarantine in your apartment until a health care agent calls you with the test results. The department of health will followup with you via phone, please do not go to your PCP while in self quarantine. Wear a mask at all times should you have to be outdoors briefly - and avoid crowds, public spaces, and mass transit at all times during this quarantine. Continue to wash your hands frequently. Please see the supplemented written instructions for further use.

## 2020-04-08 NOTE — PROGRESS NOTE ADULT - PROBLEM SELECTOR PLAN 3
COVID-19 PCR positive.  - No respiratory symptoms, saturating well on room air  - Monitor for O2 requirements  - Isolation precautions  - c/w trending CRP, d-dimer, and ferritin

## 2020-04-09 LAB
CULTURE RESULTS: SIGNIFICANT CHANGE UP
SPECIMEN SOURCE: SIGNIFICANT CHANGE UP

## 2020-04-09 PROCEDURE — 99231 SBSQ HOSP IP/OBS SF/LOW 25: CPT

## 2020-04-09 RX ORDER — POLYETHYLENE GLYCOL 3350 17 G/17G
17 POWDER, FOR SOLUTION ORAL DAILY
Refills: 0 | Status: DISCONTINUED | OUTPATIENT
Start: 2020-04-09 | End: 2020-04-10

## 2020-04-09 RX ORDER — METOPROLOL TARTRATE 50 MG
100 TABLET ORAL EVERY 12 HOURS
Refills: 0 | Status: DISCONTINUED | OUTPATIENT
Start: 2020-04-09 | End: 2020-04-12

## 2020-04-09 RX ADMIN — Medication 180 MILLIGRAM(S): at 09:47

## 2020-04-09 RX ADMIN — Medication 650 MILLIGRAM(S): at 02:01

## 2020-04-09 RX ADMIN — POLYETHYLENE GLYCOL 3350 17 GRAM(S): 17 POWDER, FOR SOLUTION ORAL at 11:50

## 2020-04-09 RX ADMIN — APIXABAN 5 MILLIGRAM(S): 2.5 TABLET, FILM COATED ORAL at 22:27

## 2020-04-09 RX ADMIN — Medication 650 MILLIGRAM(S): at 22:33

## 2020-04-09 RX ADMIN — FAMOTIDINE 20 MILLIGRAM(S): 10 INJECTION INTRAVENOUS at 11:50

## 2020-04-09 RX ADMIN — Medication 5 MILLIGRAM(S): at 22:27

## 2020-04-09 RX ADMIN — Medication 100 MILLIGRAM(S): at 18:51

## 2020-04-09 RX ADMIN — Medication 0.12 MILLIGRAM(S): at 06:39

## 2020-04-09 RX ADMIN — APIXABAN 5 MILLIGRAM(S): 2.5 TABLET, FILM COATED ORAL at 09:47

## 2020-04-09 RX ADMIN — Medication 50 MILLIGRAM(S): at 06:39

## 2020-04-09 RX ADMIN — ATORVASTATIN CALCIUM 20 MILLIGRAM(S): 80 TABLET, FILM COATED ORAL at 22:27

## 2020-04-09 NOTE — PROGRESS NOTE ADULT - ATTENDING COMMENTS
I was physically present for the key portions of the evaluation and management (E/M) service provided.  I agree with the above history, physical, and plan which I have reviewed and edited where appropriate.    Patient COVID+.  Improving.  NO BM x several days.  Miralax ordered.  Patient elevated BP will go back on home meds.
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
## 2020-04-09 NOTE — PROGRESS NOTE ADULT - ASSESSMENT
74F with PMHx HTN, CVA, HLD, Afib admitted for COVID positive and LLE weakness    - Will give miralax stat and then daily to assist with BM  - Eliquis started inpatient (warfarin at home), will continue  - Discussed with pt need for PT. PT will work with pt today to get her OOB  - Will monitor O2 and respiratory status  - Seen with Dr. Eli who agrees with plan above   - Will follow up PT   - No new labs

## 2020-04-09 NOTE — PHYSICAL THERAPY INITIAL EVALUATION ADULT - LEVEL OF INDEPENDENCE: SIT/SUPINE, REHAB EVAL
moderate assist (50% patients effort)
moderate assist (50% patients effort)/maximum assist (25% patients effort)

## 2020-04-09 NOTE — PHYSICAL THERAPY INITIAL EVALUATION ADULT - PHYSICAL ASSIST/NONPHYSICAL ASSIST: SUPINE/SIT, REHAB EVAL
set-up required/verbal cues/supervision/nonverbal cues (demo/gestures)/1 person assist
set-up required/supervision/nonverbal cues (demo/gestures)/verbal cues/1 person assist

## 2020-04-09 NOTE — PHYSICAL THERAPY INITIAL EVALUATION ADULT - PLANNED THERAPY INTERVENTIONS, PT EVAL
bed mobility training/strengthening/balance training/gait training/transfer training
bed mobility training/strengthening/transfer training/gait training/ROM/balance training

## 2020-04-09 NOTE — PHYSICAL THERAPY INITIAL EVALUATION ADULT - BED MOBILITY LIMITATIONS, REHAB EVAL
impaired ability to control trunk for mobility/decreased ability to use legs for bridging/pushing/decreased ability to use arms for pushing/pulling
impaired ability to control trunk for mobility/decreased ability to use arms for pushing/pulling/decreased ability to use legs for bridging/pushing

## 2020-04-09 NOTE — PHYSICAL THERAPY INITIAL EVALUATION ADULT - LEVEL OF INDEPENDENCE: SIT/STAND, REHAB EVAL
maximum assist (25% patients effort)
moderate assist (50% patients effort)/maximum assist (25% patients effort)

## 2020-04-09 NOTE — PHYSICAL THERAPY INITIAL EVALUATION ADULT - PHYSICAL ASSIST/NONPHYSICAL ASSIST: SIT/SUPINE, REHAB EVAL
supervision/nonverbal cues (demo/gestures)/set-up required/1 person assist/verbal cues
supervision/verbal cues/set-up required/1 person assist/nonverbal cues (demo/gestures)

## 2020-04-09 NOTE — PHYSICAL THERAPY INITIAL EVALUATION ADULT - PHYSICAL ASSIST/NONPHYSICAL ASSIST: STAND/SIT, REHAB EVAL
verbal cues/supervision/1 person assist/nonverbal cues (demo/gestures)
supervision/set-up required/verbal cues/1 person assist/nonverbal cues (demo/gestures)

## 2020-04-09 NOTE — PROGRESS NOTE ADULT - SUBJECTIVE AND OBJECTIVE BOX
24 hr events: Pt seen and evaluated with Dr. Eli at [facility] this AM using Portuguese  phone. Denies any changes in cough. Feels weak and has been refusing PT.   Reports its been "months" since she last had a bowel movement.   Denies abdominal pain, nausea, vomiting, diarrhea, CP, SOB.     Vital Signs Last 24 Hrs  T(C): 36.9 (09 Apr 2020 09:48), Max: 38.1 (08 Apr 2020 23:15)  T(F): 98.5 (09 Apr 2020 09:48), Max: 100.5 (08 Apr 2020 23:15)  HR: 71 (09 Apr 2020 10:18) (71 - 91)  BP: 132/81 (09 Apr 2020 09:48) (132/81 - 145/82)  BP(mean): --  RR: 18 (09 Apr 2020 09:48) (18 - 20)  SpO2: 95% (09 Apr 2020 10:18) (94% - 95%)    Physical Exam:  General: NAD, comfortable lying in bed, pleasant  Pulmonary: Nonlabored breathing, no respiratory distress  Abdominal: soft, NT/ND, no organomegaly  Neuro: A/O x3, no focal deficits    No new labs.

## 2020-04-09 NOTE — PHYSICAL THERAPY INITIAL EVALUATION ADULT - RANGE OF MOTION EXAMINATION, REHAB EVAL
Right UE ROM was WNL (within normal limits)/deficits as listed below/L arm post stroke contrature
LUE flexion contracture/Right UE ROM was WFL (within functional limits)/deficits as listed below/bilateral lower extremity ROM was WFL (within functional limits)

## 2020-04-09 NOTE — PHYSICAL THERAPY INITIAL EVALUATION ADULT - PHYSICAL ASSIST/NONPHYSICAL ASSIST: SIT/STAND, REHAB EVAL
2 person assist
set-up required/supervision/verbal cues/1 person assist/nonverbal cues (demo/gestures)

## 2020-04-09 NOTE — PHYSICAL THERAPY INITIAL EVALUATION ADULT - IMPAIRMENTS CONTRIBUTING IMPAIRED BED MOBILITY, REHAB EVAL
decreased strength
decreased strength/impaired motor control/abnormal muscle tone/LUE/decreased flexibility/impaired balance/decreased ROM

## 2020-04-09 NOTE — CHART NOTE - NSCHARTNOTEFT_GEN_A_CORE
Pt transferred from [facility] and accepted to [facility] CAP Unit.   Sign out received from primary [facility] team and reviewed.   All inpatient orders and medications have been reviewed and updated.   Vital signs stable, pt is alert and oriented x 3 person and place  Goals have been discussed. Pt is awaiting PT consult and then placement with JANICE

## 2020-04-10 LAB
CULTURE RESULTS: SIGNIFICANT CHANGE UP
SPECIMEN SOURCE: SIGNIFICANT CHANGE UP

## 2020-04-10 PROCEDURE — 99231 SBSQ HOSP IP/OBS SF/LOW 25: CPT

## 2020-04-10 RX ORDER — POLYETHYLENE GLYCOL 3350 17 G/17G
17 POWDER, FOR SOLUTION ORAL
Refills: 0 | Status: DISCONTINUED | OUTPATIENT
Start: 2020-04-10 | End: 2020-04-14

## 2020-04-10 RX ORDER — MINERAL OIL
133 OIL (ML) MISCELLANEOUS ONCE
Refills: 0 | Status: COMPLETED | OUTPATIENT
Start: 2020-04-10 | End: 2020-04-10

## 2020-04-10 RX ADMIN — Medication 100 MILLIGRAM(S): at 18:49

## 2020-04-10 RX ADMIN — APIXABAN 5 MILLIGRAM(S): 2.5 TABLET, FILM COATED ORAL at 10:27

## 2020-04-10 RX ADMIN — APIXABAN 5 MILLIGRAM(S): 2.5 TABLET, FILM COATED ORAL at 23:01

## 2020-04-10 RX ADMIN — ATORVASTATIN CALCIUM 20 MILLIGRAM(S): 80 TABLET, FILM COATED ORAL at 23:02

## 2020-04-10 RX ADMIN — Medication 100 MILLIGRAM(S): at 05:50

## 2020-04-10 RX ADMIN — POLYETHYLENE GLYCOL 3350 17 GRAM(S): 17 POWDER, FOR SOLUTION ORAL at 05:34

## 2020-04-10 RX ADMIN — Medication 5 MILLIGRAM(S): at 23:02

## 2020-04-10 RX ADMIN — Medication 133 MILLILITER(S): at 13:01

## 2020-04-10 RX ADMIN — FAMOTIDINE 20 MILLIGRAM(S): 10 INJECTION INTRAVENOUS at 13:00

## 2020-04-10 RX ADMIN — POLYETHYLENE GLYCOL 3350 17 GRAM(S): 17 POWDER, FOR SOLUTION ORAL at 23:02

## 2020-04-10 RX ADMIN — Medication 180 MILLIGRAM(S): at 10:27

## 2020-04-10 RX ADMIN — Medication 0.12 MILLIGRAM(S): at 05:49

## 2020-04-10 NOTE — PROGRESS NOTE ADULT - SUBJECTIVE AND OBJECTIVE BOX
SUBJECTIVE:  Pt seen and examined with Dr. Eli at [facility].  Denies abd pain, nausea, vomiting, diarrhea, constipation, Chest pain, SOB.       Vital Signs Last 24 Hrs  T(C): 36.7 (10 Apr 2020 10:30), Max: 38.8 (09 Apr 2020 21:05)  T(F): 98 (10 Apr 2020 10:30), Max: 101.8 (09 Apr 2020 21:05)  HR: 106 (10 Apr 2020 14:50) (75 - 107)  BP: 140/92 (10 Apr 2020 10:30) (140/92 - 188/93)  BP(mean): --  RR: 20 (10 Apr 2020 10:30) (18 - 20)  SpO2: 92% (10 Apr 2020 14:50) (92% - 95%)    Physical Exam:  General: NAD, comfortable lying in bed, pleasant,  A&O x 3  Pulmonary: Nonlabored breathing, no respiratory distress  Abdominal: soft, slightly distended compared to yesterday   Extremities: warm, well perfused, no clubbing/cyanosis/edema      4F with PMHx HTN, CVA, HLD, Afib admitted for COVID positive and LLE weakness  - Note fever overnight, improved with tylenol   - Daily miralax, added mineral oil enema today- will f/u results.   - Eliquis started inpatient (warfarin at home), will continue  - PT, OOB ,WBAT   - Will monitor O2 and respiratory status  - Seen with Dr. Eli who agrees with plan above  - No new labs   - Awaiting JANICE, had fever overnight            Assessment/Plan :  74yFemale  -  -Monitor O2 saturation and respiratory symptoms  -Tylenol PRN fever  - Dispo:  -Seen and discussed with Dr. Eli who agrees with plan

## 2020-04-10 NOTE — PROGRESS NOTE ADULT - ATTENDING COMMENTS
I was physically present for the key portions of the evaluation and management (E/M) service provided.  I agree with the above history, physical, and plan which I have reviewed and edited where appropriate.    Patient seen at bedside with  on the phone.  Patient complaining of abdominal pain, lack of BM and poor appetite.  Patient abdomen mildly distended with mild pain to palpation.  +BS.  Patient to receive enema and miralax.  Will monitor response.  Continue PT.

## 2020-04-11 PROCEDURE — 99231 SBSQ HOSP IP/OBS SF/LOW 25: CPT

## 2020-04-11 RX ORDER — ACETAMINOPHEN 500 MG
650 TABLET ORAL ONCE
Refills: 0 | Status: COMPLETED | OUTPATIENT
Start: 2020-04-11 | End: 2020-04-11

## 2020-04-11 RX ADMIN — FAMOTIDINE 20 MILLIGRAM(S): 10 INJECTION INTRAVENOUS at 11:13

## 2020-04-11 RX ADMIN — Medication 5 MILLIGRAM(S): at 21:55

## 2020-04-11 RX ADMIN — Medication 180 MILLIGRAM(S): at 11:13

## 2020-04-11 RX ADMIN — APIXABAN 5 MILLIGRAM(S): 2.5 TABLET, FILM COATED ORAL at 11:13

## 2020-04-11 RX ADMIN — Medication 100 MILLIGRAM(S): at 18:22

## 2020-04-11 RX ADMIN — Medication 650 MILLIGRAM(S): at 01:30

## 2020-04-11 RX ADMIN — APIXABAN 5 MILLIGRAM(S): 2.5 TABLET, FILM COATED ORAL at 21:55

## 2020-04-11 RX ADMIN — Medication 100 MILLIGRAM(S): at 07:24

## 2020-04-11 RX ADMIN — ATORVASTATIN CALCIUM 20 MILLIGRAM(S): 80 TABLET, FILM COATED ORAL at 21:56

## 2020-04-11 RX ADMIN — Medication 0.12 MILLIGRAM(S): at 07:23

## 2020-04-11 NOTE — PROGRESS NOTE ADULT - ATTENDING COMMENTS
I was physically present for the key portions of the evaluation and management (E/M) service provided.  I agree with the above history, physical, and plan which I have reviewed and edited where appropriate.    Patient stable.  Improved after BM.  Awaiting JANICE.

## 2020-04-11 NOTE — PROGRESS NOTE ADULT - SUBJECTIVE AND OBJECTIVE BOX
SUBJECTIVE:  Pt seen and examined with Dr. Eli at [facility]. Pt reports her cough is improving an less bothersome. Also she had a large BM this AM and notes her abdominal discomfort from yesterday is improved.   Denies abd pain, nausea, vomiting, diarrhea, constipation, Chest pain, SOB.       Vital Signs Last 24 Hrs  T(C): 36.4 (11 Apr 2020 11:11), Max: 38.8 (11 Apr 2020 01:19)  T(F): 97.5 (11 Apr 2020 11:11), Max: 101.9 (11 Apr 2020 01:19)  HR: 107 (11 Apr 2020 11:11) (90 - 114)  BP: 140/83 (11 Apr 2020 11:11) (118/79 - 182/122)  BP(mean): --  RR: 18 (11 Apr 2020 11:11) (18 - 20)  SpO2: 93% (11 Apr 2020 11:11) (92% - 93%)    Physical Exam:  General: NAD, comfortable lying in bed, pleasant,  A&O x   Pulmonary: Nonlabored breathing, no respiratory distress  Abdominal: soft, NT/ND- distension from yesterday resolved.  Extremities: warm, well perfused, no clubbing/cyanosis/edema      74F with PMHx HTN, CVA, HLD, Afib admitted for COVID positive and LLE weakness  - Note fever overnight, 101.9, improved with tylenol  - Eliquis started inpatient (she takes warfarin at home), will continue  - PT, OOB ,WBAT   - Will monitor O2 and respiratory status  - Seen with Dr. Eli who agrees with plan above  - No new labs   - Awaiting JANICE, had fever overnight

## 2020-04-12 PROCEDURE — 99232 SBSQ HOSP IP/OBS MODERATE 35: CPT

## 2020-04-12 RX ORDER — METOPROLOL TARTRATE 50 MG
200 TABLET ORAL EVERY 12 HOURS
Refills: 0 | Status: DISCONTINUED | OUTPATIENT
Start: 2020-04-12 | End: 2020-04-14

## 2020-04-12 RX ADMIN — Medication 0.12 MILLIGRAM(S): at 06:29

## 2020-04-12 RX ADMIN — FAMOTIDINE 20 MILLIGRAM(S): 10 INJECTION INTRAVENOUS at 13:18

## 2020-04-12 RX ADMIN — Medication 100 MILLIGRAM(S): at 06:29

## 2020-04-12 RX ADMIN — Medication 5 MILLIGRAM(S): at 22:17

## 2020-04-12 RX ADMIN — APIXABAN 5 MILLIGRAM(S): 2.5 TABLET, FILM COATED ORAL at 13:17

## 2020-04-12 RX ADMIN — ATORVASTATIN CALCIUM 20 MILLIGRAM(S): 80 TABLET, FILM COATED ORAL at 22:17

## 2020-04-12 RX ADMIN — Medication 180 MILLIGRAM(S): at 09:02

## 2020-04-12 RX ADMIN — Medication 200 MILLIGRAM(S): at 17:57

## 2020-04-12 RX ADMIN — APIXABAN 5 MILLIGRAM(S): 2.5 TABLET, FILM COATED ORAL at 22:17

## 2020-04-12 NOTE — PROGRESS NOTE ADULT - SUBJECTIVE AND OBJECTIVE BOX
SUBJECTIVE:  spoken with Czech  #[id number], pt seen sitting in chair, denies cough, fevers, chills, shortness of breath. will walk with PT today    MEDICATIONS  (STANDING):  apixaban 5 milliGRAM(s) Oral every 12 hours  atorvastatin 20 milliGRAM(s) Oral at bedtime  digoxin     Tablet 0.125 milliGRAM(s) Oral daily  diltiazem    milliGRAM(s) Oral every 24 hours  famotidine    Tablet 20 milliGRAM(s) Oral daily  metoprolol tartrate 200 milliGRAM(s) Oral every 12 hours  oxybutynin 5 milliGRAM(s) Oral at bedtime    MEDICATIONS  (PRN):  acetaminophen   Tablet .. 650 milliGRAM(s) Oral every 6 hours PRN Temp greater or equal to 38C (100.4F)  polyethylene glycol 3350 17 Gram(s) Oral two times a day PRN Constipation      Vital Signs Last 24 Hrs  T(C): 36.3 (12 Apr 2020 10:00), Max: 37.6 (11 Apr 2020 20:49)  T(F): 97.4 (12 Apr 2020 10:00), Max: 99.7 (11 Apr 2020 20:49)  HR: 91 (12 Apr 2020 10:10) (88 - 116)  BP: 152/78 (12 Apr 2020 10:00) (146/80 - 160/68)  BP(mean): 99 (11 Apr 2020 20:49) (99 - 99)  RR: 19 (12 Apr 2020 10:00) (18 - 20)  SpO2: 94% (12 Apr 2020 10:10) (93% - 94%)    Physical Exam:  General: NAD, resting comfortably in bed  Pulmonary: Nonlabored breathing, no respiratory distress  Cardiovascular: NSR  Abdominal: soft, NT/ND  Extremities: WWP, normal strength  Neuro: A/O x 3, CNs II-XII grossly intact, no focal deficits, normal motor/sensation  Pulses: palpable distal pulses    I&O's Summary    11 Apr 2020 07:01  -  12 Apr 2020 07:00  --------------------------------------------------------  IN: 0 mL / OUT: 650 mL / NET: -650 mL        LABS:              CAPILLARY BLOOD GLUCOSE            RADIOLOGY & ADDITIONAL STUDIES:

## 2020-04-12 NOTE — PROGRESS NOTE ADULT - ASSESSMENT
74F with PMHx HTN, CVA, HLD, Afib admitted for COVID positive and LLE weakness  - Eliquis started inpatient (she takes warfarin at home), will continue  - PT, OOB ,WBAT   - Will monitor O2 and respiratory status  - HR continuing to be tachy, increase metoprolol dose to 200mg BID  - Seen with Dr. Eli who agrees with plan above  - AM labs for tomorrow - CPR, d-dimer, ferritin, cbc with diff, bmp, digoxin level  - Awaiting JANICE, had fever 4/11

## 2020-04-13 LAB
ANION GAP SERPL CALC-SCNC: 11 MMOL/L — SIGNIFICANT CHANGE UP (ref 9–16)
BASOPHILS # BLD AUTO: 0.05 K/UL — SIGNIFICANT CHANGE UP (ref 0–0.2)
BASOPHILS NFR BLD AUTO: 1 % — SIGNIFICANT CHANGE UP (ref 0–2)
BUN SERPL-MCNC: 25 MG/DL — HIGH (ref 7–23)
CALCIUM SERPL-MCNC: 8.8 MG/DL — SIGNIFICANT CHANGE UP (ref 8.5–10.5)
CHLORIDE SERPL-SCNC: 102 MMOL/L — SIGNIFICANT CHANGE UP (ref 96–108)
CO2 SERPL-SCNC: 25 MMOL/L — SIGNIFICANT CHANGE UP (ref 22–31)
CREAT SERPL-MCNC: 0.85 MG/DL — SIGNIFICANT CHANGE UP (ref 0.5–1.3)
EOSINOPHIL # BLD AUTO: 0.02 K/UL — SIGNIFICANT CHANGE UP (ref 0–0.5)
EOSINOPHIL NFR BLD AUTO: 0.4 % — SIGNIFICANT CHANGE UP (ref 0–6)
GLUCOSE SERPL-MCNC: 82 MG/DL — SIGNIFICANT CHANGE UP (ref 70–99)
HCT VFR BLD CALC: 52.4 % — HIGH (ref 34.5–45)
HGB BLD-MCNC: 17.7 G/DL — HIGH (ref 11.5–15.5)
IMM GRANULOCYTES NFR BLD AUTO: 0.4 % — SIGNIFICANT CHANGE UP (ref 0–1.5)
LYMPHOCYTES # BLD AUTO: 1.55 K/UL — SIGNIFICANT CHANGE UP (ref 1–3.3)
LYMPHOCYTES # BLD AUTO: 31.8 % — SIGNIFICANT CHANGE UP (ref 13–44)
MCHC RBC-ENTMCNC: 29.4 PG — SIGNIFICANT CHANGE UP (ref 27–34)
MCHC RBC-ENTMCNC: 33.8 GM/DL — SIGNIFICANT CHANGE UP (ref 32–36)
MCV RBC AUTO: 86.9 FL — SIGNIFICANT CHANGE UP (ref 80–100)
MONOCYTES # BLD AUTO: 0.56 K/UL — SIGNIFICANT CHANGE UP (ref 0–0.9)
MONOCYTES NFR BLD AUTO: 11.5 % — SIGNIFICANT CHANGE UP (ref 2–14)
NEUTROPHILS # BLD AUTO: 2.68 K/UL — SIGNIFICANT CHANGE UP (ref 1.8–7.4)
NEUTROPHILS NFR BLD AUTO: 54.9 % — SIGNIFICANT CHANGE UP (ref 43–77)
NRBC # BLD: 0 /100 WBCS — SIGNIFICANT CHANGE UP (ref 0–0)
PLATELET # BLD AUTO: 141 K/UL — LOW (ref 150–400)
POTASSIUM SERPL-MCNC: 3 MMOL/L — LOW (ref 3.5–5.3)
POTASSIUM SERPL-SCNC: 3 MMOL/L — LOW (ref 3.5–5.3)
RBC # BLD: 6.03 M/UL — HIGH (ref 3.8–5.2)
RBC # FLD: 13 % — SIGNIFICANT CHANGE UP (ref 10.3–14.5)
SODIUM SERPL-SCNC: 138 MMOL/L — SIGNIFICANT CHANGE UP (ref 132–145)
WBC # BLD: 4.88 K/UL — SIGNIFICANT CHANGE UP (ref 3.8–10.5)
WBC # FLD AUTO: 4.88 K/UL — SIGNIFICANT CHANGE UP (ref 3.8–10.5)

## 2020-04-13 PROCEDURE — 99232 SBSQ HOSP IP/OBS MODERATE 35: CPT

## 2020-04-13 RX ORDER — POTASSIUM CHLORIDE 20 MEQ
40 PACKET (EA) ORAL ONCE
Refills: 0 | Status: COMPLETED | OUTPATIENT
Start: 2020-04-13 | End: 2020-04-13

## 2020-04-13 RX ADMIN — Medication 200 MILLIGRAM(S): at 18:04

## 2020-04-13 RX ADMIN — APIXABAN 5 MILLIGRAM(S): 2.5 TABLET, FILM COATED ORAL at 11:42

## 2020-04-13 RX ADMIN — Medication 40 MILLIEQUIVALENT(S): at 10:15

## 2020-04-13 RX ADMIN — Medication 180 MILLIGRAM(S): at 11:43

## 2020-04-13 RX ADMIN — Medication 5 MILLIGRAM(S): at 21:22

## 2020-04-13 RX ADMIN — ATORVASTATIN CALCIUM 20 MILLIGRAM(S): 80 TABLET, FILM COATED ORAL at 21:21

## 2020-04-13 RX ADMIN — Medication 200 MILLIGRAM(S): at 08:04

## 2020-04-13 RX ADMIN — Medication 0.12 MILLIGRAM(S): at 08:05

## 2020-04-13 RX ADMIN — APIXABAN 5 MILLIGRAM(S): 2.5 TABLET, FILM COATED ORAL at 21:21

## 2020-04-13 RX ADMIN — FAMOTIDINE 20 MILLIGRAM(S): 10 INJECTION INTRAVENOUS at 12:06

## 2020-04-13 NOTE — PROGRESS NOTE ADULT - SUBJECTIVE AND OBJECTIVE BOX
SUBJECTIVE:  pt seen at bedside,  [id number]; states she is feeling okay. has some abdominal pain. admits to having BM yesterday. denies cough, fevers, chills    MEDICATIONS  (STANDING):  apixaban 5 milliGRAM(s) Oral every 12 hours  atorvastatin 20 milliGRAM(s) Oral at bedtime  digoxin     Tablet 0.125 milliGRAM(s) Oral daily  diltiazem    milliGRAM(s) Oral every 24 hours  famotidine    Tablet 20 milliGRAM(s) Oral daily  metoprolol tartrate 200 milliGRAM(s) Oral every 12 hours  oxybutynin 5 milliGRAM(s) Oral at bedtime    MEDICATIONS  (PRN):  acetaminophen   Tablet .. 650 milliGRAM(s) Oral every 6 hours PRN Temp greater or equal to 38C (100.4F)  polyethylene glycol 3350 17 Gram(s) Oral two times a day PRN Constipation      Vital Signs Last 24 Hrs  T(C): 36.7 (13 Apr 2020 07:33), Max: 36.7 (13 Apr 2020 07:33)  T(F): 98.1 (13 Apr 2020 07:33), Max: 98.1 (13 Apr 2020 07:33)  HR: 70 (13 Apr 2020 10:30) (67 - 88)  BP: 143/96 (13 Apr 2020 07:33) (131/89 - 143/96)  BP(mean): --  RR: 18 (13 Apr 2020 07:33) (18 - 18)  SpO2: 95% (13 Apr 2020 10:30) (93% - 96%)    Physical Exam:  General: NAD, resting comfortably in bed  Pulmonary: Nonlabored breathing, no respiratory distress  Cardiovascular: NSR  Abdominal: soft, NT/ND  Extremities: WWP, normal strength  Neuro: A/O x 3, no focal deficits, normal motor/sensation  Pulses: palpable distal pulses    I&O's Summary      LABS:                        17.7   4.88  )-----------( 141      ( 13 Apr 2020 07:43 )             52.4     04-13    138  |  102  |  25<H>  ----------------------------<  82  3.0<L>   |  25  |  0.85    Ca    8.8      13 Apr 2020 07:43          CAPILLARY BLOOD GLUCOSE            RADIOLOGY & ADDITIONAL STUDIES:

## 2020-04-13 NOTE — PROGRESS NOTE ADULT - ATTENDING COMMENTS
I was physically present for the key portions of the evaluation and management (E/M) service provided.  I agree with the above history, physical, and plan which I have reviewed and edited where appropriate.    Patient continues to improve.  Respiratory status improved.  Eating and drinking better.  OOB to chair for most of day.  Awaiting placement.

## 2020-04-13 NOTE — PROGRESS NOTE ADULT - ASSESSMENT
74F with PMHx HTN, CVA, HLD, Afib admitted for COVID positive and LLE weakness  - Eliquis started inpatient (she takes warfarin at home), will continue  - PT, OOB ,WBAT   - Will monitor O2 and respiratory status  - HR continuing to be tachy, increase metoprolol dose to 200mg BID  - Seen with Dr. Eli who agrees with plan above  - AM labs today stable  - Awaiting [facility], had fever 4/11

## 2020-04-14 VITALS — OXYGEN SATURATION: 94 % | HEART RATE: 78 BPM

## 2020-04-14 LAB
ANION GAP SERPL CALC-SCNC: 9 MMOL/L — SIGNIFICANT CHANGE UP (ref 9–16)
BUN SERPL-MCNC: 25 MG/DL — HIGH (ref 7–23)
CALCIUM SERPL-MCNC: 9 MG/DL — SIGNIFICANT CHANGE UP (ref 8.5–10.5)
CHLORIDE SERPL-SCNC: 104 MMOL/L — SIGNIFICANT CHANGE UP (ref 96–108)
CO2 SERPL-SCNC: 26 MMOL/L — SIGNIFICANT CHANGE UP (ref 22–31)
CREAT SERPL-MCNC: 0.87 MG/DL — SIGNIFICANT CHANGE UP (ref 0.5–1.3)
GLUCOSE SERPL-MCNC: 103 MG/DL — HIGH (ref 70–99)
POTASSIUM SERPL-MCNC: 3.7 MMOL/L — SIGNIFICANT CHANGE UP (ref 3.5–5.3)
POTASSIUM SERPL-SCNC: 3.7 MMOL/L — SIGNIFICANT CHANGE UP (ref 3.5–5.3)
SODIUM SERPL-SCNC: 139 MMOL/L — SIGNIFICANT CHANGE UP (ref 132–145)

## 2020-04-14 PROCEDURE — 83735 ASSAY OF MAGNESIUM: CPT

## 2020-04-14 PROCEDURE — 87040 BLOOD CULTURE FOR BACTERIA: CPT

## 2020-04-14 PROCEDURE — 85379 FIBRIN DEGRADATION QUANT: CPT

## 2020-04-14 PROCEDURE — 99238 HOSP IP/OBS DSCHRG MGMT 30/<: CPT

## 2020-04-14 PROCEDURE — 85025 COMPLETE CBC W/AUTO DIFF WBC: CPT

## 2020-04-14 PROCEDURE — 36415 COLL VENOUS BLD VENIPUNCTURE: CPT

## 2020-04-14 PROCEDURE — 71250 CT THORAX DX C-: CPT

## 2020-04-14 PROCEDURE — 87581 M.PNEUMON DNA AMP PROBE: CPT

## 2020-04-14 PROCEDURE — 87798 DETECT AGENT NOS DNA AMP: CPT

## 2020-04-14 PROCEDURE — 97161 PT EVAL LOW COMPLEX 20 MIN: CPT

## 2020-04-14 PROCEDURE — 80048 BASIC METABOLIC PNL TOTAL CA: CPT

## 2020-04-14 PROCEDURE — 85610 PROTHROMBIN TIME: CPT

## 2020-04-14 PROCEDURE — 85730 THROMBOPLASTIN TIME PARTIAL: CPT

## 2020-04-14 PROCEDURE — 99284 EMERGENCY DEPT VISIT MOD MDM: CPT

## 2020-04-14 PROCEDURE — 87635 SARS-COV-2 COVID-19 AMP PRB: CPT

## 2020-04-14 PROCEDURE — 84484 ASSAY OF TROPONIN QUANT: CPT

## 2020-04-14 PROCEDURE — 87633 RESP VIRUS 12-25 TARGETS: CPT

## 2020-04-14 PROCEDURE — 82728 ASSAY OF FERRITIN: CPT

## 2020-04-14 PROCEDURE — 87486 CHLMYD PNEUM DNA AMP PROBE: CPT

## 2020-04-14 PROCEDURE — 83605 ASSAY OF LACTIC ACID: CPT

## 2020-04-14 PROCEDURE — 82962 GLUCOSE BLOOD TEST: CPT

## 2020-04-14 PROCEDURE — 96365 THER/PROPH/DIAG IV INF INIT: CPT

## 2020-04-14 PROCEDURE — 84100 ASSAY OF PHOSPHORUS: CPT

## 2020-04-14 PROCEDURE — 83880 ASSAY OF NATRIURETIC PEPTIDE: CPT

## 2020-04-14 PROCEDURE — 70450 CT HEAD/BRAIN W/O DYE: CPT

## 2020-04-14 PROCEDURE — 87631 RESP VIRUS 3-5 TARGETS: CPT

## 2020-04-14 PROCEDURE — 96375 TX/PRO/DX INJ NEW DRUG ADDON: CPT

## 2020-04-14 PROCEDURE — 96368 THER/DIAG CONCURRENT INF: CPT

## 2020-04-14 PROCEDURE — 80053 COMPREHEN METABOLIC PANEL: CPT

## 2020-04-14 PROCEDURE — 93005 ELECTROCARDIOGRAM TRACING: CPT

## 2020-04-14 PROCEDURE — 82550 ASSAY OF CK (CPK): CPT

## 2020-04-14 PROCEDURE — 86140 C-REACTIVE PROTEIN: CPT

## 2020-04-14 PROCEDURE — 99285 EMERGENCY DEPT VISIT HI MDM: CPT | Mod: 25

## 2020-04-14 PROCEDURE — 96366 THER/PROPH/DIAG IV INF ADDON: CPT

## 2020-04-14 RX ORDER — APIXABAN 2.5 MG/1
1 TABLET, FILM COATED ORAL
Qty: 0 | Refills: 0 | DISCHARGE
Start: 2020-04-14

## 2020-04-14 RX ORDER — POLYETHYLENE GLYCOL 3350 17 G/17G
17 POWDER, FOR SOLUTION ORAL
Qty: 0 | Refills: 0 | DISCHARGE
Start: 2020-04-14

## 2020-04-14 RX ORDER — DIGOXIN 250 MCG
0 TABLET ORAL
Qty: 30 | Refills: 0 | DISCHARGE

## 2020-04-14 RX ORDER — WARFARIN SODIUM 2.5 MG/1
0 TABLET ORAL
Qty: 30 | Refills: 0 | DISCHARGE

## 2020-04-14 RX ORDER — DILTIAZEM HCL 120 MG
1 CAPSULE, EXT RELEASE 24 HR ORAL
Qty: 0 | Refills: 0 | DISCHARGE
Start: 2020-04-14

## 2020-04-14 RX ORDER — DILTIAZEM HCL 120 MG
0 CAPSULE, EXT RELEASE 24 HR ORAL
Qty: 30 | Refills: 0 | DISCHARGE

## 2020-04-14 RX ORDER — OXYBUTYNIN CHLORIDE 5 MG
0 TABLET ORAL
Qty: 60 | Refills: 0 | DISCHARGE

## 2020-04-14 RX ORDER — OXYBUTYNIN CHLORIDE 5 MG
1 TABLET ORAL
Qty: 0 | Refills: 0 | DISCHARGE
Start: 2020-04-14

## 2020-04-14 RX ORDER — POTASSIUM CHLORIDE 20 MEQ
40 PACKET (EA) ORAL ONCE
Refills: 0 | Status: DISCONTINUED | OUTPATIENT
Start: 2020-04-14 | End: 2020-04-14

## 2020-04-14 RX ORDER — DIGOXIN 250 MCG
1 TABLET ORAL
Qty: 0 | Refills: 0 | DISCHARGE
Start: 2020-04-14

## 2020-04-14 RX ORDER — METOPROLOL TARTRATE 50 MG
2 TABLET ORAL
Qty: 0 | Refills: 0 | DISCHARGE
Start: 2020-04-14

## 2020-04-14 RX ORDER — METOPROLOL TARTRATE 50 MG
0 TABLET ORAL
Qty: 60 | Refills: 0 | DISCHARGE

## 2020-04-14 RX ADMIN — Medication 180 MILLIGRAM(S): at 09:51

## 2020-04-14 RX ADMIN — Medication 0.12 MILLIGRAM(S): at 06:24

## 2020-04-14 RX ADMIN — APIXABAN 5 MILLIGRAM(S): 2.5 TABLET, FILM COATED ORAL at 09:51

## 2020-04-14 RX ADMIN — Medication 200 MILLIGRAM(S): at 06:25

## 2020-04-14 RX ADMIN — FAMOTIDINE 20 MILLIGRAM(S): 10 INJECTION INTRAVENOUS at 11:47

## 2020-04-14 NOTE — PROGRESS NOTE ADULT - SUBJECTIVE AND OBJECTIVE BOX
SUBJECTIVE:  Pt seen and examined with Dr. Eli at [facility].  Denies abd pain, nausea, vomiting, diarrhea, constipation, Chest pain, SOB.     Vital Signs Last 24 Hrs  T(C): 36.4 (13 Apr 2020 21:36), Max: 36.7 (13 Apr 2020 17:50)  T(F): 97.6 (13 Apr 2020 21:36), Max: 98.1 (13 Apr 2020 17:50)  HR: 87 (13 Apr 2020 21:36) (61 - 88)  BP: 116/60 (13 Apr 2020 21:36) (116/60 - 116/60)  BP(mean): --  RR: --  SpO2: 95% (13 Apr 2020 21:36) (93% - 96%)    Physical Exam:  General: NAD, comfortable lying in bed, pleasant,  A&O x   Pulmonary: Nonlabored breathing, no respiratory distress  Abdominal: soft, NT/ND  Extremities: warm, well perfused, no clubbing/cyanosis/edema    LABS:                        17.7   4.88  )-----------( 141      ( 13 Apr 2020 07:43 )             52.4     04-13    138  |  102  |  25<H>  ----------------------------<  82  3.0<L>   |  25  |  0.85    Ca    8.8      13 Apr 2020 07:43    CAPILLARY BLOOD GLUCOSE    Assessment/Plan :    -Monitor O2 saturation and respiratory symptoms  -Tylenol PRN fever  - Dispo:  -Seen and discussed with Dr. Eli who agrees with plan SUBJECTIVE:  Pt seen and examined with Dr. Eli at [facility].  Denies abd pain, nausea, vomiting, diarrhea, constipation, Chest pain, SOB. Pt a&0x3, 2 assist ambulation, NAD.    SUBJECTIVE:  Pt seen and examined with Dr. Eli at [facility].  Denies abd pain, nausea, vomiting, diarrhea, constipation, Chest pain, SOB.     Vital Signs Last 24 Hrs  T(C): 36.9 (14 Apr 2020 09:13), Max: 36.9 (14 Apr 2020 09:13)  T(F): 98.4 (14 Apr 2020 09:13), Max: 98.4 (14 Apr 2020 09:13)  HR: 72 (14 Apr 2020 10:45) (61 - 87)  BP: 130/70 (14 Apr 2020 09:13) (116/60 - 130/70)  BP(mean): --  RR: 18 (14 Apr 2020 09:13) (18 - 18)  SpO2: 96% RA (14 Apr 2020 10:45) (93% - 97%)    Physical Exam:  General: NAD, comfortable lying in bed, pleasant,  A&O x   Pulmonary: Nonlabored breathing, no respiratory distress  Abdominal: soft, NT/ND  Extremities: warm, well perfused, no clubbing/cyanosis/edema    LABS:                        17.7   4.88  )-----------( 141      ( 13 Apr 2020 07:43 )             52.4     04-14    139  |  104  |  25<H>  ----------------------------<  103<H>  3.7   |  26  |  0.87    Ca    9.0      14 Apr 2020 11:57

## 2020-04-14 NOTE — PROGRESS NOTE ADULT - REASON FOR ADMISSION
weakness
weakness, cough
weakness
weakness
weakness, COVID+

## 2020-04-14 NOTE — PROGRESS NOTE ADULT - ATTENDING COMMENTS
I was physically present for the key portions of the evaluation and management (E/M) service provided.  I agree with the above history, physical, and plan which I have reviewed and edited where appropriate.    Patient continues to improve.  Respiratory status improved.  Eating and drinking better.  OOB to chair for most of day.  Awaiting placement. I was physically present for the key portions of the evaluation and management (E/M) service provided.  I agree with the above history, physical, and plan which I have reviewed and edited where appropriate.    Patient continues to improve.  Respiratory status improved.  Eating and drinking better.  OOB to chair for most of day.  Patient with improved K this am after repletion yesterday.  Patient can be discharged to rehab.

## 2020-04-14 NOTE — DISCHARGE NOTE NURSING/CASE MANAGEMENT/SOCIAL WORK - PATIENT PORTAL LINK FT
You can access the FollowMyHealth Patient Portal offered by [facility] by registering at the following website: [email or web address]. By joining [facility]’s FollowMyHealth portal, you will also be able to view your health information using other applications (apps) compatible with our system.

## 2020-04-14 NOTE — PROGRESS NOTE ADULT - ASSESSMENT
74F with PMHx HTN, CVA, HLD, Afib admitted for COVID positive and LLE weakness  - Eliquis started inpatient (she takes warfarin at home), will continue  - PT, OOB ,WBAT   - Will monitor O2 and respiratory status  - HR continuing to be tachy, increase metoprolol dose to 200mg BID  - AM labs today stable, cbc improving  - Awaiting [facility], had fever 4/11  - Seen with Dr. Eli who agrees with plan above 74F with PMHx HTN, CVA, HLD, Afib admitted for COVID positive and LLE weakness  - Eliquis started inpatient (she takes warfarin at home), will continue at d/c  - PT, OOB ,WBAT 2 assist  - Will monitor O2 and respiratory status   - Metoprolol increased to 200 mg BID for rate control  -k+ repleted on 4/13, AM bmp today k+ 3.7  -Pt ready to be d/c'd to [facility]  - Seen with Dr. lEi who agrees with plan above

## 2020-04-25 DIAGNOSIS — U07.1 COVID-19: ICD-10-CM

## 2020-04-25 DIAGNOSIS — I69.392 FACIAL WEAKNESS FOLLOWING CEREBRAL INFARCTION: ICD-10-CM

## 2020-04-25 DIAGNOSIS — I48.92 UNSPECIFIED ATRIAL FLUTTER: ICD-10-CM

## 2020-04-25 DIAGNOSIS — E78.5 HYPERLIPIDEMIA, UNSPECIFIED: ICD-10-CM

## 2020-04-25 DIAGNOSIS — J12.89 OTHER VIRAL PNEUMONIA: ICD-10-CM

## 2020-04-25 DIAGNOSIS — Z79.01 LONG TERM (CURRENT) USE OF ANTICOAGULANTS: ICD-10-CM

## 2020-04-25 DIAGNOSIS — I69.334 MONOPLEGIA OF UPPER LIMB FOLLOWING CEREBRAL INFARCTION AFFECTING LEFT NON-DOMINANT SIDE: ICD-10-CM

## 2020-04-25 DIAGNOSIS — H40.9 UNSPECIFIED GLAUCOMA: ICD-10-CM

## 2020-04-25 DIAGNOSIS — R53.1 WEAKNESS: ICD-10-CM

## 2020-04-25 DIAGNOSIS — I10 ESSENTIAL (PRIMARY) HYPERTENSION: ICD-10-CM

## 2021-03-17 NOTE — ED PROVIDER NOTE - SKIN COLOR
[Routine On-Treatment] : a routine on-treatment visit for [Breast Cancer] : breast cancer normal for race

## 2023-01-01 NOTE — ED ADULT TRIAGE NOTE - RESPIRATORY RATE (BREATHS/MIN)
18 Right ear hearing screen completed date: 2023  Right ear screen method: EOAE (evoked otoacoustic emission)  Right ear screen result: Passed  Right ear screen comment: N/A    Left ear hearing screen completed date: 2023  Left ear screen method: EOAE (evoked otoacoustic emission)  Left ear screen result: Passed  Left ear screen comments: N/A

## 2023-05-24 NOTE — DISCHARGE NOTE NURSING/CASE MANAGEMENT/SOCIAL WORK - NSDCPETBCESMAN_GEN_ALL_CORE
Patent If you are a smoker, it is important for your health to stop smoking. Please be aware that second hand smoke is also harmful.

## 2024-12-19 NOTE — H&P ADULT - PROBLEM/PLAN-6
DISPLAY PLAN FREE TEXT
Treatment Goal Explanation (Does Not Render In The Note): Stable for the purposes of categorizing medical decision making is defined by the specific treatment goals for an individual patient. A patient that is not at their treatment goal is not stable, even if the condition has not changed and there is no short- term threat to life or function.
Treatment Goal Met?: no

## 2025-05-21 NOTE — ACUTE INTERFACILITY TRANSFER NOTE - PLAN OF CARE
Patient informed and verbalized understanding. States she was fasting, had labs done around 10 am today and last thing to eat or drink was around 9 pm.   Resolution CT head does not show acute infarct or hemorrhage  likely secondary to infection  Fall precaution. Chest x-ray showed bilateral infiltrate  Suspected COVID-19  F/u COVID-19 PCR  Isolation precaution  Continue Ceftriaxone, azithromycin  Continue Thiamin, Vitamin C  F/u Blood culture  F/u D-dimer. CRP, ferritin, LDH. Continue Coumadin  for anticoagulation  Monitor PT/INR daily  Continue Digoxin, metoprolol for rate control. Continue Cardizem and  lopressor with parameters   Monitor  blood pressure. Continue Coumadin.

## 2025-07-25 ENCOUNTER — APPOINTMENT (OUTPATIENT)
Age: 80
End: 2025-07-25
Payer: MEDICARE

## 2025-07-25 ENCOUNTER — NON-APPOINTMENT (OUTPATIENT)
Age: 80
End: 2025-07-25

## 2025-07-25 PROCEDURE — 92004 COMPRE OPH EXAM NEW PT 1/>: CPT
